# Patient Record
Sex: MALE | Race: WHITE | NOT HISPANIC OR LATINO
[De-identification: names, ages, dates, MRNs, and addresses within clinical notes are randomized per-mention and may not be internally consistent; named-entity substitution may affect disease eponyms.]

---

## 2023-06-27 ENCOUNTER — APPOINTMENT (OUTPATIENT)
Dept: SURGERY | Facility: CLINIC | Age: 63
End: 2023-06-27
Payer: MEDICARE

## 2023-06-27 VITALS — HEIGHT: 67 IN | BODY MASS INDEX: 36.1 KG/M2 | WEIGHT: 230 LBS

## 2023-06-27 DIAGNOSIS — Z86.39 PERSONAL HISTORY OF OTHER ENDOCRINE, NUTRITIONAL AND METABOLIC DISEASE: ICD-10-CM

## 2023-06-27 PROBLEM — Z00.00 ENCOUNTER FOR PREVENTIVE HEALTH EXAMINATION: Status: ACTIVE | Noted: 2023-06-27

## 2023-06-27 PROCEDURE — 99203 OFFICE O/P NEW LOW 30 MIN: CPT

## 2023-06-27 NOTE — ASSESSMENT
[FreeTextEntry1] : Anthony is a pleasant 62 year old retired [employer] worker, who presents to the office with complaints of recent pain and swelling in the left groin, which he has been experiencing for "a couple of months or longer" and which worsened after a physical exam by his urologist approximately 3 weeks ago. He states he does not lift any heavy objects or performs any out of the ordinary strenuous activity on a regular basis.\par \par Physical examination of the right groin does not demonstrate any obvious hernia. Physical exam of his left groin is severely limited by patient's ability to complete the exam.  Patient himself points to the origin of pain to be in the left hemiscrotum.  The umbilical examination is unremarkable.  He does have a moderate diastasis recti, likely related to his excess abdominal weight.   His current BMI is 36.\par \par In light of his equivocal physical exam, I recommended obtaining an ultrasound of his left groin and scrotum in order to aid in the diagnosis and the patient agreed.  I will call him with further recommendations once the imaging is completed and reviewed with Dr. Doyle.  He was encouraged to avoid heavy lifting and strenuous activity in the interim, of course. \par

## 2023-06-27 NOTE — CONSULT LETTER
[Dear  ___] : Dear  [unfilled], [Courtesy Letter:] : I had the pleasure of seeing your patient, [unfilled], in my office today. [Please see my note below.] : Please see my note below. [Consult Closing:] : Thank you very much for allowing me to participate in the care of this patient.  If you have any questions, please do not hesitate to contact me. [FreeTextEntry3] : Sincerely,\par \par Miri Fowler PA-C, VAERY\par  [DrBud  ___] : Dr. ALLEN

## 2023-06-27 NOTE — PHYSICAL EXAM
[Respiratory Effort] : normal respiratory effort [No Rash or Lesion] : No rash or lesion [Alert] : alert [Calm] : calm [JVD] : no jugular venous distention  [de-identified] : overweight [de-identified] : normal [de-identified] : moderately protuberant abdomen [de-identified] : severe tenderness to palpation limiting exam

## 2023-07-07 ENCOUNTER — RESULT REVIEW (OUTPATIENT)
Age: 63
End: 2023-07-07

## 2023-07-07 ENCOUNTER — OUTPATIENT (OUTPATIENT)
Dept: OUTPATIENT SERVICES | Facility: HOSPITAL | Age: 63
LOS: 1 days | End: 2023-07-07
Payer: MEDICARE

## 2023-07-07 DIAGNOSIS — R31.9 HEMATURIA, UNSPECIFIED: ICD-10-CM

## 2023-07-07 DIAGNOSIS — Z00.8 ENCOUNTER FOR OTHER GENERAL EXAMINATION: ICD-10-CM

## 2023-07-07 PROCEDURE — 76870 US EXAM SCROTUM: CPT | Mod: 26

## 2023-07-07 PROCEDURE — 76870 US EXAM SCROTUM: CPT

## 2023-07-08 DIAGNOSIS — R31.9 HEMATURIA, UNSPECIFIED: ICD-10-CM

## 2023-07-10 ENCOUNTER — NON-APPOINTMENT (OUTPATIENT)
Age: 63
End: 2023-07-10

## 2023-08-13 ENCOUNTER — RESULT REVIEW (OUTPATIENT)
Age: 63
End: 2023-08-13

## 2023-08-13 ENCOUNTER — OUTPATIENT (OUTPATIENT)
Dept: OUTPATIENT SERVICES | Facility: HOSPITAL | Age: 63
LOS: 1 days | End: 2023-08-13
Payer: MEDICARE

## 2023-08-13 DIAGNOSIS — R93.812 ABNORMAL RADIOLOGIC FINDINGS ON DIAGNOSTIC IMAGING OF LEFT TESTICLE: ICD-10-CM

## 2023-08-13 PROCEDURE — 72196 MRI PELVIS W/DYE: CPT

## 2023-08-13 PROCEDURE — 72196 MRI PELVIS W/DYE: CPT | Mod: 26,MH

## 2023-08-13 PROCEDURE — A9579: CPT

## 2023-08-14 DIAGNOSIS — R93.812 ABNORMAL RADIOLOGIC FINDINGS ON DIAGNOSTIC IMAGING OF LEFT TESTICLE: ICD-10-CM

## 2023-08-15 ENCOUNTER — EMERGENCY (EMERGENCY)
Facility: HOSPITAL | Age: 63
LOS: 0 days | Discharge: HOME | End: 2023-08-15
Attending: EMERGENCY MEDICINE
Payer: MEDICARE

## 2023-08-15 ENCOUNTER — NON-APPOINTMENT (OUTPATIENT)
Age: 63
End: 2023-08-15

## 2023-08-15 VITALS
WEIGHT: 229.94 LBS | DIASTOLIC BLOOD PRESSURE: 84 MMHG | OXYGEN SATURATION: 98 % | HEIGHT: 66 IN | TEMPERATURE: 98 F | RESPIRATION RATE: 14 BRPM | HEART RATE: 56 BPM | SYSTOLIC BLOOD PRESSURE: 172 MMHG

## 2023-08-15 VITALS
HEART RATE: 60 BPM | SYSTOLIC BLOOD PRESSURE: 166 MMHG | OXYGEN SATURATION: 98 % | DIASTOLIC BLOOD PRESSURE: 89 MMHG | RESPIRATION RATE: 16 BRPM

## 2023-08-15 DIAGNOSIS — N50.82 SCROTAL PAIN: ICD-10-CM

## 2023-08-15 DIAGNOSIS — N50.812 LEFT TESTICULAR PAIN: ICD-10-CM

## 2023-08-15 LAB
ALBUMIN SERPL ELPH-MCNC: 5.1 G/DL — SIGNIFICANT CHANGE UP (ref 3.5–5.2)
ALP SERPL-CCNC: 100 U/L — SIGNIFICANT CHANGE UP (ref 30–115)
ALT FLD-CCNC: 30 U/L — SIGNIFICANT CHANGE UP (ref 0–41)
ANION GAP SERPL CALC-SCNC: 12 MMOL/L — SIGNIFICANT CHANGE UP (ref 7–14)
APPEARANCE UR: CLEAR — SIGNIFICANT CHANGE UP
AST SERPL-CCNC: 28 U/L — SIGNIFICANT CHANGE UP (ref 0–41)
BILIRUB SERPL-MCNC: 1 MG/DL — SIGNIFICANT CHANGE UP (ref 0.2–1.2)
BILIRUB UR-MCNC: NEGATIVE — SIGNIFICANT CHANGE UP
BUN SERPL-MCNC: 16 MG/DL — SIGNIFICANT CHANGE UP (ref 10–20)
CALCIUM SERPL-MCNC: 10.3 MG/DL — SIGNIFICANT CHANGE UP (ref 8.4–10.4)
CHLORIDE SERPL-SCNC: 98 MMOL/L — SIGNIFICANT CHANGE UP (ref 98–110)
CO2 SERPL-SCNC: 25 MMOL/L — SIGNIFICANT CHANGE UP (ref 17–32)
COLOR SPEC: YELLOW — SIGNIFICANT CHANGE UP
CREAT SERPL-MCNC: 1.1 MG/DL — SIGNIFICANT CHANGE UP (ref 0.7–1.5)
DIFF PNL FLD: NEGATIVE — SIGNIFICANT CHANGE UP
EGFR: 76 ML/MIN/1.73M2 — SIGNIFICANT CHANGE UP
GLUCOSE SERPL-MCNC: 111 MG/DL — HIGH (ref 70–99)
GLUCOSE UR QL: NEGATIVE MG/DL — SIGNIFICANT CHANGE UP
HCT VFR BLD CALC: 45.6 % — SIGNIFICANT CHANGE UP (ref 42–52)
HGB BLD-MCNC: 15.6 G/DL — SIGNIFICANT CHANGE UP (ref 14–18)
KETONES UR-MCNC: NEGATIVE MG/DL — SIGNIFICANT CHANGE UP
LEUKOCYTE ESTERASE UR-ACNC: NEGATIVE — SIGNIFICANT CHANGE UP
MCHC RBC-ENTMCNC: 30.1 PG — SIGNIFICANT CHANGE UP (ref 27–31)
MCHC RBC-ENTMCNC: 34.2 G/DL — SIGNIFICANT CHANGE UP (ref 32–37)
MCV RBC AUTO: 87.9 FL — SIGNIFICANT CHANGE UP (ref 80–94)
NITRITE UR-MCNC: NEGATIVE — SIGNIFICANT CHANGE UP
NRBC # BLD: 0 /100 WBCS — SIGNIFICANT CHANGE UP (ref 0–0)
PH UR: 7 — SIGNIFICANT CHANGE UP (ref 5–8)
PLATELET # BLD AUTO: 277 K/UL — SIGNIFICANT CHANGE UP (ref 130–400)
PMV BLD: 9.9 FL — SIGNIFICANT CHANGE UP (ref 7.4–10.4)
POTASSIUM SERPL-MCNC: 5.5 MMOL/L — HIGH (ref 3.5–5)
POTASSIUM SERPL-SCNC: 5.5 MMOL/L — HIGH (ref 3.5–5)
PROT SERPL-MCNC: 8.6 G/DL — HIGH (ref 6–8)
PROT UR-MCNC: NEGATIVE MG/DL — SIGNIFICANT CHANGE UP
RBC # BLD: 5.19 M/UL — SIGNIFICANT CHANGE UP (ref 4.7–6.1)
RBC # FLD: 13.3 % — SIGNIFICANT CHANGE UP (ref 11.5–14.5)
SODIUM SERPL-SCNC: 135 MMOL/L — SIGNIFICANT CHANGE UP (ref 135–146)
SP GR SPEC: 1.01 — SIGNIFICANT CHANGE UP (ref 1–1.03)
UROBILINOGEN FLD QL: 1 MG/DL — SIGNIFICANT CHANGE UP (ref 0.2–1)
WBC # BLD: 8.65 K/UL — SIGNIFICANT CHANGE UP (ref 4.8–10.8)
WBC # FLD AUTO: 8.65 K/UL — SIGNIFICANT CHANGE UP (ref 4.8–10.8)

## 2023-08-15 PROCEDURE — 99284 EMERGENCY DEPT VISIT MOD MDM: CPT

## 2023-08-15 PROCEDURE — 99284 EMERGENCY DEPT VISIT MOD MDM: CPT | Mod: 25

## 2023-08-15 PROCEDURE — 87086 URINE CULTURE/COLONY COUNT: CPT

## 2023-08-15 PROCEDURE — 85027 COMPLETE CBC AUTOMATED: CPT

## 2023-08-15 PROCEDURE — 80053 COMPREHEN METABOLIC PANEL: CPT

## 2023-08-15 PROCEDURE — 76870 US EXAM SCROTUM: CPT

## 2023-08-15 PROCEDURE — 36415 COLL VENOUS BLD VENIPUNCTURE: CPT

## 2023-08-15 PROCEDURE — 99283 EMERGENCY DEPT VISIT LOW MDM: CPT

## 2023-08-15 PROCEDURE — 81003 URINALYSIS AUTO W/O SCOPE: CPT

## 2023-08-15 PROCEDURE — 76870 US EXAM SCROTUM: CPT | Mod: 26

## 2023-08-15 NOTE — CONSULT NOTE ADULT - SUBJECTIVE AND OBJECTIVE BOX
UROLOGY CONSULT NOTE:    Pt is a 61yo Male with no pmh presents to the ER, sent by Dr. Doyle for MRI result findings of poor blood flow in left testicle -  called for further evaluation. Pt reports he follows with Dr. Duffy as outpatient and last seen him a few months ago. Pt reports after bedside examination for inguinal hernia he felt sharp pain to the left scrotum and was told he may have a inguinal hernia. Pt decided to follow-up with hernia care center with Dr. Doyle. Pt was recommended to obtain MRI for further imaging. Pt denies any recent trauma, fever, chills, N/V, abdominal pain, flank pain, dysuria, hematuria, SOB, or chest pain.       PAST MEDICAL & SURGICAL HISTORY:  denies pmh   denies psh        Allergies  No Known Allergies        SOCIAL HISTORY: No illicit drug use    REVIEW OF SYSTEMS   [x] A ten-point review of systems was otherwise negative except as noted.      Vital Signs Last 24 Hrs  T(C): 36.7 (15 Aug 2023 13:23), Max: 36.7 (15 Aug 2023 13:23)  T(F): 98 (15 Aug 2023 13:23), Max: 98 (15 Aug 2023 13:23)  HR: 56 (15 Aug 2023 13:23) (56 - 56)  BP: 172/84 (15 Aug 2023 13:23) (172/84 - 172/84)  RR: 14 (15 Aug 2023 13:23) (14 - 14)  SpO2: 98% (15 Aug 2023 13:23) (98% - 98%)    Parameters below as of 15 Aug 2023 13:23  Patient On (Oxygen Delivery Method): room air        PHYSICAL EXAM:  GEN: NAD, awake and alert.  SKIN: Good color, non diaphoretic.  RESP: Non-labored breathing. No use of accessory muscles.  ABDO: Soft, NT/ND, no palpable bladder, no suprapubic tenderness.  BACK: No CVAT B/L  : + Circumcised male. right descended testicle, unable to palpate left testicle, high riding. no TTP, erythema or edema noted. No meatal discharge.   MSK: SCHNEIDER x 4  EXT: no b/l LE edema   NEURO: A&O X3    LABS:                        15.6   8.65  )-----------( 277      ( 15 Aug 2023 16:06 )             45.6     08-15    135  |  98  |  16  ----------------------------<  111<H>  5.5<H>   |  25  |  1.1    Ca    10.3      15 Aug 2023 16:06    TPro  8.6<H>  /  Alb  5.1  /  TBili  1.0  /  DBili  x   /  AST  28  /  ALT  30  /  AlkPhos  100  08-15          RADIOLOGY & ADDITIONAL STUDIES:  pending scrotal US

## 2023-08-15 NOTE — CONSULT NOTE ADULT - NS ATTEND AMEND GEN_ALL_CORE FT
I personally saw and examined the patient, reviewed the chart and available data. I discussed the situation with the patient and his wife as well as ER attending and JAJA WING. I also reviewed and/or amended the note as necessary.

## 2023-08-15 NOTE — ED PROVIDER NOTE - PHYSICAL EXAMINATION
VITAL SIGNS: I have reviewed nursing notes and confirm.  CONSTITUTIONAL: Well-appearing, non-toxic, in NAD  SKIN: Warm dry, normal skin turgor  HEAD: NCAT  EYES: No conjunctival injection, scleral anicteric  ENT: Moist mucous membranes, normal pharynx with no erythema or exudates  NECK: Supple; full ROM. Nontender. No cervical LAD  CARD: RRR, no murmurs, rubs or gallops  RESP: Clear to ausculation bilaterally.  No rales, rhonchi, or wheezing.  ABD: Soft, non-distended, non-tender, no rebound or guarding. No CVA tenderness  : left testicle not-fully distended and tender to palpation - chronic per patient  EXT: Full ROM, no bony tenderness, no pedal edema, no calf tenderness  NEURO: Normal motor, normal sensory. CN II-XII grossly intact. Cerebellar testing normal. Normal gait.  PSYCH: Cooperative, appropriate.

## 2023-08-15 NOTE — ED PROVIDER NOTE - PATIENT PORTAL LINK FT
You can access the FollowMyHealth Patient Portal offered by [facility] by registering at the following website: [email or web address]. By joining [facility]’s FollowMyHealth portal, you will also be able to view your health information using other applications (apps) compatible with our system. You can access the FollowMyHealth Patient Portal offered by [facility] by registering at the following website: [email or web address]. By joining [facility]’s FollowMyHealth portal, you will also be able to view your health information using other applications (apps) compatible with our system.

## 2023-08-15 NOTE — ED ADULT TRIAGE NOTE - CHIEF COMPLAINT QUOTE
per pt had recent MRI done outpt and showed "poor blood flow in left testicle" sent in by dr contreras

## 2023-08-15 NOTE — CONSULT NOTE ADULT - ASSESSMENT
61yo Male with no pmh presents to the ER, sent by Dr. Doyle for MRI result findings of poor blood flow in left testicle -  called for further evaluation.    Plan:  ·	recommend scrotal sonogram   ·	WBC wnl  ·	Cr stable 1.1    ·	keep NPO for now   ·	spoke with ED, aware of plan   ·	will discuss with attending  61yo Male with no pmh presents to the ER, sent by Dr. Doyle for MRI result findings of poor blood flow in left testicle -  called for further evaluation.    Plan:  ·	recommend scrotal sonogram   ·	WBC wnl  ·	Cr stable 1.1    ·	keep NPO for now   ·	spoke with ED, aware of plan   ·	will discuss with attending   ·	reviewed films, note and spoke to the pt and his wife  ·	his pain has been on and off for years and he never mentioned it to dr elizabeth as he thought it was part of aging  ·	after sono was non dxtic he went to [place] and on return had hte mri and now the sono here  ·	flow is quite poor to the left testicle, ??? 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  ·	Rec f/u with his urologist of close to 15 years to discuss options  ·	if pain not bother him enough ? let it continue to involute   ·	if it bothers him or turns necrotic go for a simple orchiectomy without or with a prosthesis    pt and wife sonya think about it and if they wish f/u with me they will call the office

## 2023-08-15 NOTE — ED PROVIDER NOTE - NSFOLLOWUPINSTRUCTIONS_ED_ALL_ED_FT
Our Emergency Department Referral Coordinators will be reaching out to you in the next 24-48 hours from 9:00am to 5:00pm with a follow up appointment. Please expect a phone call from the hospital in that time frame. If you do not receive a call or if you have any questions or concerns, you can reach them at   [phone]     Testicle Pain    WHAT YOU NEED TO KNOW:    Testicle pain may start in your scrotum and spread to your abdomen. You may have sharp, sudden pain or dull pain that happens over time. Your testicle pain may come and go, or it may last for a long time. The cause of your pain may be unknown. Testicle pain can be caused by infection, trauma, hernia, kidney stones, or sexually transmitted infections (STIs). You may have a painful lump in your scrotum. The lump may be caused by an enlarged vein or fluid that collects around one of your testicles. This lump also may be caused by a more serious medical condition. Part of your testicle may twist. This is a serious condition that needs treatment as soon as possible.    DISCHARGE INSTRUCTIONS:    Medicines:    Antibiotics: This medicine helps fight or prevent infection. Take your antibiotics until they are gone, even if you feel better.    Pain medicine: You may be given a prescription medicine to decrease pain. Do not wait until the pain is severe before you take this medicine.    NSAIDs: These medicines decrease swelling, pain, and fever. NSAIDs are available without a doctor's order. Ask your healthcare provider which medicine is right for you. Ask how much to take and when to take it. Take as directed. NSAIDs can cause stomach bleeding and kidney problems if not taken correctly.    Take your medicine as directed. Contact your healthcare provider if you think your medicine is not helping or if you have side effects. Tell your provider if you are allergic to any medicine. Keep a list of the medicines, vitamins, and herbs you take. Include the amounts, and when and why you take them. Bring the list or the pill bottles to follow-up visits. Carry your medicine list with you in case of an emergency.  Decrease discomfort: With treatment, your pain may improve within 1 to 3 days. Depending on the cause of your testicle pain, your condition may take up to 4 weeks to heal.    Rest: Limit your activity until your pain decreases. Get more rest while you heal. Do not sit for long periods of time.    Cold packs: Place cold packs on your testicles to help ease your pain. Use cold packs as directed.    Elevation: Gently tuck a folded towel under your testicles to lift them as you sit in a chair or lie in bed. This will help ease your pain and decrease swelling.  Follow up with your healthcare provider or urologist in 3 to 7 days: Write down your questions so you remember to ask them during your visits.    Sexual activity: Avoid sexual activity until you have finished your antibiotics or until your healthcare provider tells you it is safe to have sex. Use condoms to lower your risk of STIs.    Contact your healthcare provider or urologist if:    You feel that your medicine or treatment is not working.    You feel more pain, tenderness, or swelling than before.    You have nausea or a low fever.    You have questions or concerns about your condition or care.  Return to the emergency department if:    You have sudden or severe pain in your testicles or abdomen.    You have pain in both testicles.    You are vomiting.    You have a high fever.    Your pain increases when you elevate your testicles.    Your scrotum turns blue. This could mean your testicle is not getting the blood flow it needs.

## 2023-08-15 NOTE — ED PROVIDER NOTE - ATTENDING CONTRIBUTION TO CARE
62-year-old man with with no significant PMHx, in the ER for evaluation of testicular pain, abnormal testicular MRI.  Patient states his L testicle has always been very high riding as compared to his R testicle.  He has been having pain in L testicle for the past 2 years, ~ 4 months ago had  appointment with Dr. Duffy, states experienced very sharp pain to his L testicle when he was examined, which has been continuous since that time.  Patient was told to follow-up with surgery, Dr. Doyle for hernia evaluation. As part of hernia eval,  pt had scrotal ultrasound 7/7/2023 which showed septated cystic structure adjacent to L testicle, MRI recommended.  MRI done 2 days ago showed chronic ischemia/infarction of the L testicle; small fat-containing L inguinal hernia.  Patient was called in, told to come to ER for evaluation.  Patient denies any dysuria/hematuria/frequency.  No abdominal pain.  No N/V/D.  No F/C.  No CP/SOB.  Denies any trauma to area.  No HA/dizziness/syncope.  PE - nad, nc/at, eomi, perrl, op - clear, mmm, neck supple, cta b/l, no w/r/r, rrr, abd- soft, nt/nd, nabs, (with resident Mitch):  circ male, no penile tenderness/swelling,R testicle - no tenderness/swelling,  high riding L testicle, + tenderness to L testicle,  from x 4, no LE swelling/tenderness, A&O x 3, cn 2-12 intact, no focal motor/sensory deficits.   -Check labs, UA, testicular sono,  eval

## 2023-08-15 NOTE — ED ADULT NURSE NOTE - OBJECTIVE STATEMENT
Dr. Doyle for MRI result findings of poor blood flow in left testicle -  called for further evaluation. Pt reports he follows with Dr. Duffy as outpatient and last seen him a few months ago. Pt reports after bedside examination for inguinal hernia he felt sharp pain to the left scrotum and was told he may have a inguinal hernia

## 2023-08-15 NOTE — ED PROVIDER NOTE - OBJECTIVE STATEMENT
Patient is a 62 year old male with no PMH presenting for abnormal MRI results. Patient states he was sent  to the ER, sent by Dr. Doyle for MRI result findings of poor blood flow in left testicle -  called for further evaluation. Pt reports he follows with Dr. Duffy as outpatient and last seen him a few months ago. Pt reports after bedside examination for inguinal hernia he felt sharp pain to the left scrotum and was told he may have a inguinal hernia. Pt decided to follow-up with hernia care center with Dr. Doyle. Pt was recommended to obtain MRI for further imaging. Pt denies any recent trauma, fever, chills, N/V, abdominal pain, flank pain, dysuria, hematuria, SOB, or chest pain.

## 2023-08-15 NOTE — ED ADULT NURSE NOTE - NSICDXFAMILYHX_GEN_ALL_CORE_FT
Abdomen soft, non-tender, no guarding.
FAMILY HISTORY:  No pertinent family history in first degree relatives

## 2023-08-15 NOTE — ED PROVIDER NOTE - CARE PROVIDER_API CALL
Hien Moreno.  Urology  [address] [place] [address], [address] [address]  Phone: [phone]  Fax: [phone]  Follow Up Time: Routine

## 2023-08-15 NOTE — ED ADULT TRIAGE NOTE - TEMPERATURE IN FAHRENHEIT (DEGREES F)
Johana states pre-certification need to be submitted for patient MRI to be approved for insurance. Please give the number a call.Thanks      *Johana did not want to leave her contact information   
Returned call to [employer],   States the MRI will not be covered due to the following:  No exam,  No xray,  No appt,   6 week trial of conservative therapy.  Case#[id number]    Patient informed of this,  appt scheduled for next week Tue in [place],  She verbalized understanding,  No further concerns or questions.    
98

## 2023-08-15 NOTE — ED PROVIDER NOTE - CLINICAL SUMMARY MEDICAL DECISION MAKING FREE TEXT BOX
62-year-old man in ER with c/o L testicular pain for ~2 years, worse for the pas 4 months.  MRI done 2 days ago showing chronic ischemia/infarction of the L testicle.  Labs reviewed: CBC/CMP unremarkable.  UA negative.  Testicular sono: Normal-appearing R testicle.  Findings compatible with infarct of the L testicle with few areas of peripheral flow.  Patient seen and evaluated in ER by  attending  > recommended for patient to follow-up with his own urologist, Dr. Duffy, to discuss treatment options.  Patient to return to ER for worsening pain, fever, scrotal swelling/discoloration, vomiting, or any other new/concerning symptoms.  Patient understands and agrees with plan.

## 2023-08-16 LAB
CULTURE RESULTS: SIGNIFICANT CHANGE UP
SPECIMEN SOURCE: SIGNIFICANT CHANGE UP

## 2023-08-21 ENCOUNTER — APPOINTMENT (OUTPATIENT)
Dept: UROLOGY | Facility: CLINIC | Age: 63
End: 2023-08-21
Payer: MEDICARE

## 2023-08-21 VITALS
BODY MASS INDEX: 36.41 KG/M2 | WEIGHT: 232 LBS | HEIGHT: 67 IN | DIASTOLIC BLOOD PRESSURE: 65 MMHG | TEMPERATURE: 97.8 F | SYSTOLIC BLOOD PRESSURE: 108 MMHG

## 2023-08-21 PROCEDURE — 64450 NJX AA&/STRD OTHER PN/BRANCH: CPT | Mod: LT

## 2023-08-21 PROCEDURE — 99214 OFFICE O/P EST MOD 30 MIN: CPT | Mod: 25

## 2023-08-21 PROCEDURE — 64425 NJX AA&/STRD II IH NERVES: CPT | Mod: LT

## 2023-08-21 NOTE — HISTORY OF PRESENT ILLNESS
[Currently Experiencing ___] :  [unfilled] [FreeTextEntry1] : Madison is a 62-year-old male born August 23, 1960 presents for evaluation of left testicular pain with imaging that demonstrates chronic ischemia/infarction of the left testicle.  He reports having severe intermittent left testicular pain for many years.  He denies ever having surgery or trauma to the area, aside from a vasectomy over 15 years ago.  He had a hernia repair at 7 years of age, unsure of what kind.  He saw Dr. Doyle for his left groin pain and was sent for an MRI, which demonstrated poor blood flow in the left testicle.  He was sent to emergency room for further evaluation.  Ultrasound demonstrated normal-appearing right testicle with documented flow.  Left testicle findings are compatible with infarct with few areas of peripheral blood flow.  He denies any possible urinary tract symptoms or issues with erectile dysfunction/libido  I saw him when he was in the emergency room discussed the situation recommend that he follow-up with Dr. Duffy his current urologist.  After he and his wife discussed this at home they elected to see me for consultation of possible care

## 2023-08-21 NOTE — PHYSICAL EXAM
[General Appearance - Well Developed] : well developed [General Appearance - Well Nourished] : well nourished [Normal Appearance] : normal appearance [Well Groomed] : well groomed [General Appearance - In No Acute Distress] : no acute distress [Abdomen Soft] : soft [Abdomen Tenderness] : non-tender [Abdomen Hernia] : no hernia was discovered [Costovertebral Angle Tenderness] : no ~M costovertebral angle tenderness [Urethral Meatus] : meatus normal [Penis Abnormality] : normal circumcised penis [Urinary Bladder Findings] : the bladder was normal on palpation [Scrotum] : the scrotum was normal [Testes Mass (___cm)] : there were no testicular masses [] : no respiratory distress [Edema] : no peripheral edema [Respiration, Rhythm And Depth] : normal respiratory rhythm and effort [Exaggerated Use Of Accessory Muscles For Inspiration] : no accessory muscle use [Oriented To Time, Place, And Person] : oriented to person, place, and time [Affect] : the affect was normal [Mood] : the mood was normal [Not Anxious] : not anxious [Normal Station and Gait] : the gait and station were normal for the patient's age [No Focal Deficits] : no focal deficits [Femoral Lymph Nodes Enlarged Bilaterally] : femoral [Inguinal Lymph Nodes Enlarged Bilaterally] : inguinal [FreeTextEntry1] : Severe tenderness of the left epididymis.  Patient had to be injected with lidocaine as a cord block for examination

## 2023-08-21 NOTE — LETTER HEADER
[FreeTextEntry3] : Hien Moreno M.D. Director Emeritus of Urology [facility]/[address], [address] [place] [address]

## 2023-08-21 NOTE — ASSESSMENT
[FreeTextEntry1] : He has left testicular infarct for reasons unknown.  This may be a chronic as the MRI suggests.    Even with the cord block of 5 mL of 2% lidocaine the area was  and his pain seems to be located in the left caput epididymis.  his pain is significant and we discussed several options available.    #1 he has had this for a while he can live with it.  He is concerned that if we take out the testicle his testosterone will drop and his erections which right now he says are excellent we will decrease.  I cannot guarantee that he will have a drop in his testosterone but at least by ultrasound criteria there is minimal blood flow to the left testicle and I would assume that is not contributing a major portion of testosterone.  If his testosterone is infected we can always go to testosterone though obviously taking testosterone for the rest of his life would not be a choice we would make electively  2.  We could try a denervation it may or may not work as a lot more involved and he is not willing to go through that aspect   3.  We can do an epididymectomy which may or may not help again he does not want to go through a procedure and then still have the pain  4.  We can remove the left testicle.  At this time he is electing for left orchiectomy.  All aspects of procedure reviewed in detail with regard to preparation, outcomes, and adverse events.  He will get medical clearance and follow-up in the OR as scheduled.

## 2023-08-21 NOTE — LETTER BODY
[Dear  ___] : Dear  [unfilled], [Courtesy Letter:] : I had the pleasure of seeing your patient, [unfilled], in my office today. [Please see my note below.] : Please see my note below. [Sincerely,] : Sincerely, [FreeTextEntry2] : Bob Castellanos MD [address] [address]

## 2023-09-22 ENCOUNTER — RESULT REVIEW (OUTPATIENT)
Age: 63
End: 2023-09-22

## 2023-09-22 ENCOUNTER — OUTPATIENT (OUTPATIENT)
Dept: OUTPATIENT SERVICES | Facility: HOSPITAL | Age: 63
LOS: 1 days | End: 2023-09-22
Payer: MEDICARE

## 2023-09-22 VITALS
OXYGEN SATURATION: 100 % | HEART RATE: 63 BPM | WEIGHT: 229.94 LBS | TEMPERATURE: 98 F | DIASTOLIC BLOOD PRESSURE: 84 MMHG | HEIGHT: 68 IN | SYSTOLIC BLOOD PRESSURE: 140 MMHG | RESPIRATION RATE: 18 BRPM

## 2023-09-22 DIAGNOSIS — Z98.52 VASECTOMY STATUS: Chronic | ICD-10-CM

## 2023-09-22 DIAGNOSIS — Z98.890 OTHER SPECIFIED POSTPROCEDURAL STATES: Chronic | ICD-10-CM

## 2023-09-22 DIAGNOSIS — N50.812 LEFT TESTICULAR PAIN: ICD-10-CM

## 2023-09-22 DIAGNOSIS — Z01.818 ENCOUNTER FOR OTHER PREPROCEDURAL EXAMINATION: ICD-10-CM

## 2023-09-22 LAB
ALBUMIN SERPL ELPH-MCNC: 4.6 G/DL — SIGNIFICANT CHANGE UP (ref 3.5–5.2)
ALP SERPL-CCNC: 96 U/L — SIGNIFICANT CHANGE UP (ref 30–115)
ALT FLD-CCNC: 19 U/L — SIGNIFICANT CHANGE UP (ref 0–41)
ANION GAP SERPL CALC-SCNC: 14 MMOL/L — SIGNIFICANT CHANGE UP (ref 7–14)
APPEARANCE UR: CLEAR — SIGNIFICANT CHANGE UP
APTT BLD: 33.2 SEC — SIGNIFICANT CHANGE UP (ref 27–39.2)
AST SERPL-CCNC: 17 U/L — SIGNIFICANT CHANGE UP (ref 0–41)
BASOPHILS # BLD AUTO: 0.06 K/UL — SIGNIFICANT CHANGE UP (ref 0–0.2)
BASOPHILS NFR BLD AUTO: 0.7 % — SIGNIFICANT CHANGE UP (ref 0–1)
BILIRUB SERPL-MCNC: 0.6 MG/DL — SIGNIFICANT CHANGE UP (ref 0.2–1.2)
BILIRUB UR-MCNC: NEGATIVE — SIGNIFICANT CHANGE UP
BUN SERPL-MCNC: 19 MG/DL — SIGNIFICANT CHANGE UP (ref 10–20)
CALCIUM SERPL-MCNC: 9.4 MG/DL — SIGNIFICANT CHANGE UP (ref 8.4–10.5)
CHLORIDE SERPL-SCNC: 98 MMOL/L — SIGNIFICANT CHANGE UP (ref 98–110)
CO2 SERPL-SCNC: 25 MMOL/L — SIGNIFICANT CHANGE UP (ref 17–32)
COLOR SPEC: YELLOW — SIGNIFICANT CHANGE UP
CREAT SERPL-MCNC: 1.2 MG/DL — SIGNIFICANT CHANGE UP (ref 0.7–1.5)
DIFF PNL FLD: NEGATIVE — SIGNIFICANT CHANGE UP
EGFR: 68 ML/MIN/1.73M2 — SIGNIFICANT CHANGE UP
EOSINOPHIL # BLD AUTO: 0.35 K/UL — SIGNIFICANT CHANGE UP (ref 0–0.7)
EOSINOPHIL NFR BLD AUTO: 4.2 % — SIGNIFICANT CHANGE UP (ref 0–8)
GLUCOSE SERPL-MCNC: 73 MG/DL — SIGNIFICANT CHANGE UP (ref 70–99)
GLUCOSE UR QL: NEGATIVE MG/DL — SIGNIFICANT CHANGE UP
HCT VFR BLD CALC: 43.5 % — SIGNIFICANT CHANGE UP (ref 42–52)
HGB BLD-MCNC: 14.2 G/DL — SIGNIFICANT CHANGE UP (ref 14–18)
IMM GRANULOCYTES NFR BLD AUTO: 0.6 % — HIGH (ref 0.1–0.3)
INR BLD: 0.89 RATIO — SIGNIFICANT CHANGE UP (ref 0.65–1.3)
KETONES UR-MCNC: ABNORMAL MG/DL
LEUKOCYTE ESTERASE UR-ACNC: NEGATIVE — SIGNIFICANT CHANGE UP
LYMPHOCYTES # BLD AUTO: 2.72 K/UL — SIGNIFICANT CHANGE UP (ref 1.2–3.4)
LYMPHOCYTES # BLD AUTO: 32.9 % — SIGNIFICANT CHANGE UP (ref 20.5–51.1)
MCHC RBC-ENTMCNC: 29.4 PG — SIGNIFICANT CHANGE UP (ref 27–31)
MCHC RBC-ENTMCNC: 32.6 G/DL — SIGNIFICANT CHANGE UP (ref 32–37)
MCV RBC AUTO: 90.1 FL — SIGNIFICANT CHANGE UP (ref 80–94)
MONOCYTES # BLD AUTO: 0.56 K/UL — SIGNIFICANT CHANGE UP (ref 0.1–0.6)
MONOCYTES NFR BLD AUTO: 6.8 % — SIGNIFICANT CHANGE UP (ref 1.7–9.3)
NEUTROPHILS # BLD AUTO: 4.52 K/UL — SIGNIFICANT CHANGE UP (ref 1.4–6.5)
NEUTROPHILS NFR BLD AUTO: 54.8 % — SIGNIFICANT CHANGE UP (ref 42.2–75.2)
NITRITE UR-MCNC: NEGATIVE — SIGNIFICANT CHANGE UP
NRBC # BLD: 0 /100 WBCS — SIGNIFICANT CHANGE UP (ref 0–0)
PH UR: 7 — SIGNIFICANT CHANGE UP (ref 5–8)
PLATELET # BLD AUTO: 275 K/UL — SIGNIFICANT CHANGE UP (ref 130–400)
PMV BLD: 11.5 FL — HIGH (ref 7.4–10.4)
POTASSIUM SERPL-MCNC: 3.9 MMOL/L — SIGNIFICANT CHANGE UP (ref 3.5–5)
POTASSIUM SERPL-SCNC: 3.9 MMOL/L — SIGNIFICANT CHANGE UP (ref 3.5–5)
PROT SERPL-MCNC: 7.4 G/DL — SIGNIFICANT CHANGE UP (ref 6–8)
PROT UR-MCNC: NEGATIVE MG/DL — SIGNIFICANT CHANGE UP
PROTHROM AB SERPL-ACNC: 10.1 SEC — SIGNIFICANT CHANGE UP (ref 9.95–12.87)
RBC # BLD: 4.83 M/UL — SIGNIFICANT CHANGE UP (ref 4.7–6.1)
RBC # FLD: 13.4 % — SIGNIFICANT CHANGE UP (ref 11.5–14.5)
SODIUM SERPL-SCNC: 137 MMOL/L — SIGNIFICANT CHANGE UP (ref 135–146)
SP GR SPEC: 1.03 — SIGNIFICANT CHANGE UP (ref 1–1.03)
UROBILINOGEN FLD QL: 1 MG/DL — SIGNIFICANT CHANGE UP (ref 0.2–1)
WBC # BLD: 8.26 K/UL — SIGNIFICANT CHANGE UP (ref 4.8–10.8)
WBC # FLD AUTO: 8.26 K/UL — SIGNIFICANT CHANGE UP (ref 4.8–10.8)

## 2023-09-22 PROCEDURE — 81003 URINALYSIS AUTO W/O SCOPE: CPT

## 2023-09-22 PROCEDURE — 93010 ELECTROCARDIOGRAM REPORT: CPT

## 2023-09-22 PROCEDURE — 99214 OFFICE O/P EST MOD 30 MIN: CPT | Mod: 25

## 2023-09-22 PROCEDURE — 87086 URINE CULTURE/COLONY COUNT: CPT

## 2023-09-22 PROCEDURE — 80053 COMPREHEN METABOLIC PANEL: CPT

## 2023-09-22 PROCEDURE — 71046 X-RAY EXAM CHEST 2 VIEWS: CPT | Mod: 26

## 2023-09-22 PROCEDURE — 85730 THROMBOPLASTIN TIME PARTIAL: CPT

## 2023-09-22 PROCEDURE — 93005 ELECTROCARDIOGRAM TRACING: CPT

## 2023-09-22 PROCEDURE — 71046 X-RAY EXAM CHEST 2 VIEWS: CPT

## 2023-09-22 PROCEDURE — 85610 PROTHROMBIN TIME: CPT

## 2023-09-22 PROCEDURE — 36415 COLL VENOUS BLD VENIPUNCTURE: CPT

## 2023-09-22 PROCEDURE — 85025 COMPLETE CBC W/AUTO DIFF WBC: CPT

## 2023-09-22 NOTE — H&P PST ADULT - GENITOURINARY MALE
normal external genitalia/no hernia/no discharge/no mass/no tenderness/no ulcer/normal/no penile lesion/no palpable testicular mass/no scrotal mass exquisitely tender left testicle/no hernia/no discharge/no mass/no ulcer/normal/no penile lesion/no palpable testicular mass/no scrotal mass

## 2023-09-22 NOTE — H&P PST ADULT - NSSUBSTANCEUSE_GEN_ALL_CORE_SD
never used Carac Counseling:  I discussed with the patient the risks of Carac including but not limited to erythema, scaling, itching, weeping, crusting, and pain.

## 2023-09-22 NOTE — H&P PST ADULT - ASSESSMENT
npo  nkda  no nsaids  note alternatives were discussed at length including placement of a prosthesis  all other options declined

## 2023-09-22 NOTE — H&P PST ADULT - HISTORY OF PRESENT ILLNESS
Patient is a 63 year old male presenting to PAST in preparation for Transscrotal left orchiectomy on 10/10 under lsb anesthesia by Dr. Moreno  Reports he has a digital rectal exam 7/23 he began experiencing discomfort and h\saw a urologist who later performed an ultrasound. reports there is no circulation to the left testicle has been advised to have above  PATIENT CURRENTLY DENIES CHEST PAIN  SHORTNESS OF BREATH  PALPITATIONS,  DYSURIA, OR UPPER RESPIRATORY INFECTION IN PAST   2 weeks     Anesthesia Alert  NO--Difficult Airway  NO--History of neck surgery or radiation  NO--Limited ROM of neck  NO--History of Malignant hyperthermia  NO--Personal or family history of Pseudocholinesterase deficiency  NO--Prior Anesthesia Complication  NO--Latex Allergy  NO--Loose teeth  NO--History of Rheumatoid Arthritis  NO--ANNMARIE  NO-- BLEEDING RISK  NO--Other_____    Duke Activity Status Index (DASI) from [employer]  on 9/22/2023      RESULT SUMMARY:  38.2 points  The higher the score (maximum 58.2), the higher the functional status.    7.44 METs        INPUTS:  Take care of self —> 2.75 = Yes  Walk indoors —> 1.75 = Yes  Walk 1&ndash;2 blocks on level ground —> 2.75 = Yes  Climb a flight of stairs or walk up a hill —> 5.5 = Yes  Run a short distance —> 8 = Yes  Do light work around the house —> 2.7 = Yes  Do moderate work around the house —> 3.5 = Yes  Do heavy work around the house —> 0 = No  Do yardwork —> 0 = No  Have sexual relations —> 5.25 = Yes  Participate in moderate recreational activities —> 6 = Yes  Participate in strenuous sports —> 0 = No      Revised Cardiac Risk Index for Pre-Operative Risk from [employer]  on 9/22/2023      RESULT SUMMARY:  0 points  Class I Risk    3.9 %  30-day risk of death, MI, or cardiac arrest    From [employer]ppe 2017, based on pooled data from 5 high quality external validations (4 prospective). These numbers are higher than those often quoted from the now-outdated original study (Elías 1999). See Evidence for details.      INPUTS:  Elevated-risk surgery —> 0 = No  History of ischemic heart disease —> 0 = No  History of congestive heart failure —> 0 = No  History of cerebrovascular disease —> 0 = No  Pre-operative treatment with insulin —> 0 = No  Pre-operative creatinine >2 mg/dL / 176.8 µmol/L —> 0 = No          As per patient, this is their complete medical and surgical history, including medications both prescribed or over the counter.  Patient verbalized understanding of instructions and was given the opportunity to ask questions and have them answered.

## 2023-09-23 DIAGNOSIS — Z01.818 ENCOUNTER FOR OTHER PREPROCEDURAL EXAMINATION: ICD-10-CM

## 2023-09-23 DIAGNOSIS — N50.812 LEFT TESTICULAR PAIN: ICD-10-CM

## 2023-09-23 LAB
CULTURE RESULTS: SIGNIFICANT CHANGE UP
SPECIMEN SOURCE: SIGNIFICANT CHANGE UP

## 2023-10-09 NOTE — ASU PATIENT PROFILE, ADULT - SITE
scrotum
Detail Level: Simple
Instructions: This plan will send the code FBSE to the PM system.  DO NOT or CHANGE the price.
Price (Do Not Change): 0.00

## 2023-10-10 ENCOUNTER — APPOINTMENT (OUTPATIENT)
Dept: UROLOGY | Facility: HOSPITAL | Age: 63
End: 2023-10-10

## 2023-10-10 ENCOUNTER — TRANSCRIPTION ENCOUNTER (OUTPATIENT)
Age: 63
End: 2023-10-10

## 2023-10-10 ENCOUNTER — OUTPATIENT (OUTPATIENT)
Dept: OUTPATIENT SERVICES | Facility: HOSPITAL | Age: 63
LOS: 1 days | Discharge: HOME | End: 2023-10-10
Payer: MEDICARE

## 2023-10-10 ENCOUNTER — RESULT REVIEW (OUTPATIENT)
Age: 63
End: 2023-10-10

## 2023-10-10 VITALS
HEART RATE: 61 BPM | HEIGHT: 68 IN | TEMPERATURE: 98 F | WEIGHT: 229.94 LBS | DIASTOLIC BLOOD PRESSURE: 77 MMHG | RESPIRATION RATE: 16 BRPM | SYSTOLIC BLOOD PRESSURE: 132 MMHG | OXYGEN SATURATION: 100 %

## 2023-10-10 VITALS
RESPIRATION RATE: 16 BRPM | DIASTOLIC BLOOD PRESSURE: 73 MMHG | OXYGEN SATURATION: 98 % | SYSTOLIC BLOOD PRESSURE: 114 MMHG | HEART RATE: 63 BPM

## 2023-10-10 DIAGNOSIS — Z98.52 VASECTOMY STATUS: Chronic | ICD-10-CM

## 2023-10-10 DIAGNOSIS — N50.812 LEFT TESTICULAR PAIN: ICD-10-CM

## 2023-10-10 DIAGNOSIS — Z98.890 OTHER SPECIFIED POSTPROCEDURAL STATES: Chronic | ICD-10-CM

## 2023-10-10 PROCEDURE — 88313 SPECIAL STAINS GROUP 2: CPT

## 2023-10-10 PROCEDURE — 88342 IMHCHEM/IMCYTCHM 1ST ANTB: CPT

## 2023-10-10 PROCEDURE — 88305 TISSUE EXAM BY PATHOLOGIST: CPT

## 2023-10-10 PROCEDURE — 88341 IMHCHEM/IMCYTCHM EA ADD ANTB: CPT

## 2023-10-10 PROCEDURE — 54520 REMOVAL OF TESTIS: CPT | Mod: LT

## 2023-10-10 PROCEDURE — 88312 SPECIAL STAINS GROUP 1: CPT | Mod: 26

## 2023-10-10 PROCEDURE — 88312 SPECIAL STAINS GROUP 1: CPT

## 2023-10-10 PROCEDURE — 88341 IMHCHEM/IMCYTCHM EA ADD ANTB: CPT | Mod: 26

## 2023-10-10 PROCEDURE — 88313 SPECIAL STAINS GROUP 2: CPT | Mod: 26

## 2023-10-10 PROCEDURE — 88305 TISSUE EXAM BY PATHOLOGIST: CPT | Mod: 26

## 2023-10-10 PROCEDURE — 88342 IMHCHEM/IMCYTCHM 1ST ANTB: CPT | Mod: 26

## 2023-10-10 RX ORDER — ONDANSETRON 8 MG/1
4 TABLET, FILM COATED ORAL ONCE
Refills: 0 | Status: DISCONTINUED | OUTPATIENT
Start: 2023-10-10 | End: 2023-10-10

## 2023-10-10 RX ORDER — HYDROMORPHONE HYDROCHLORIDE 2 MG/ML
1 INJECTION INTRAMUSCULAR; INTRAVENOUS; SUBCUTANEOUS
Refills: 0 | Status: DISCONTINUED | OUTPATIENT
Start: 2023-10-10 | End: 2023-10-10

## 2023-10-10 RX ORDER — OXYCODONE HYDROCHLORIDE 5 MG/1
5 TABLET ORAL ONCE
Refills: 0 | Status: DISCONTINUED | OUTPATIENT
Start: 2023-10-10 | End: 2023-10-10

## 2023-10-10 RX ORDER — SODIUM CHLORIDE 9 MG/ML
1000 INJECTION, SOLUTION INTRAVENOUS
Refills: 0 | Status: DISCONTINUED | OUTPATIENT
Start: 2023-10-10 | End: 2023-10-10

## 2023-10-10 RX ORDER — HYDROMORPHONE HYDROCHLORIDE 2 MG/ML
0.5 INJECTION INTRAMUSCULAR; INTRAVENOUS; SUBCUTANEOUS
Refills: 0 | Status: DISCONTINUED | OUTPATIENT
Start: 2023-10-10 | End: 2023-10-10

## 2023-10-10 NOTE — PRE-ANESTHESIA EVALUATION ADULT - MALLAMPATI CLASS
Class III - visualization of the soft palate and the base of the uvula O-Z Plasty Text: The defect edges were debeveled with a #15 scalpel blade.  Given the location of the defect, shape of the defect and the proximity to free margins an O-Z plasty (double transposition flap) was deemed most appropriate.  Using a sterile surgical marker, the appropriate transposition flaps were drawn incorporating the defect and placing the expected incisions within the relaxed skin tension lines where possible.    The area thus outlined was incised deep to adipose tissue with a #15 scalpel blade.  The skin margins were undermined to an appropriate distance in all directions utilizing iris scissors.  Hemostasis was achieved with electrocautery.  The flaps were then transposed into place, one clockwise and the other counterclockwise, and anchored with interrupted buried subcutaneous sutures.

## 2023-10-10 NOTE — BRIEF OPERATIVE NOTE - NSICDXBRIEFPREOP_GEN_ALL_CORE_FT
PRE-OP DIAGNOSIS:  Orchialgia 10-Oct-2023 10:26:45  Christin Mckeon   PRE-OP DIAGNOSIS:  Orchialgia 10-Oct-2023 10:26:45 left Christin Mckeon

## 2023-10-10 NOTE — CHART NOTE - NSCHARTNOTEFT_GEN_A_CORE
PACU ANESTHESIA ADMISSION NOTE      Procedure: Orchiectomy, transscrotal, simple  left transscrotal orchiectomy      Post op diagnosis:      ____  Intubated  TV:______       Rate: ______      FiO2: ______    _x___  Patent Airway    _x___  Full return of protective reflexes    _x___  Full recovery from anesthesia / back to baseline status    Vitals:  T(C): 36.7 (10-10-23 @ 08:13), Max: 36.7 (10-10-23 @ 07:29)  HR: 61 (10-10-23 @ 08:13) (61 - 61)  BP: 132/77 (10-10-23 @ 08:13) (132/77 - 132/77)  RR: 16 (10-10-23 @ 08:13) (16 - 16)  SpO2: 100% (10-10-23 @ 08:13) (100% - 100%)    Mental Status:  _x___ Awake   _____ Alert   _____ Drowsy   _____ Sedated    Nausea/Vomiting:  _x___  NO       ______Yes,   See Post - Op Orders         Pain Scale (0-10):  __0___    Treatment: _x___ None    ____ See Post - Op/PCA Orders    Post - Operative Fluids:   __x__ Oral   ____ See Post - Op Orders    Plan: Discharge:   _x___Home       _____Floor     _____Critical Care    _____  Other:_________________    Comments:  No anesthesia issues or complications noted.  Discharge when criteria met.

## 2023-10-10 NOTE — ASU DISCHARGE PLAN (ADULT/PEDIATRIC) - ASU DC SPECIAL INSTRUCTIONSFT
Patient is to keep dressing dry for 3 days, can shower after 3 days, Bath after 1 week   Can take tylenol every 4-6 hours as needed for pain control   Patient is to follow-up with Dr. Moreno as outpatient in 2 weeks Patient is to keep dressing dry for 3 days, can shower after 3 days, Bath after 1 week   Can take tylenol every 4-6 hours as needed for pain control today  advil or the like tomorrow  Patient is to follow-up with Dr. Moreno as outpatient in 2 - 3 weeks

## 2023-10-10 NOTE — BRIEF OPERATIVE NOTE - COMMENTS
I, Christin Mckeon PA-C, performed the duties of first assist during the above listed surgery which includes, but is not limited to, retraction, suctioning and suturing. There was no competent resident available for the case.

## 2023-10-10 NOTE — BRIEF OPERATIVE NOTE - NSICDXBRIEFPROCEDURE_GEN_ALL_CORE_FT
PROCEDURES:  Orchiectomy, transscrotal, simple 10-Oct-2023 10:27:24 left transscrotal orchiectomy Christin Mckeon

## 2023-10-10 NOTE — ASU DISCHARGE PLAN (ADULT/PEDIATRIC) - CARE PROVIDER_API CALL
Hien Moreno.  Urology  [address] [place] [address], [address] [address]  Phone: [phone]  Fax: [phone]  Follow Up Time:

## 2023-10-13 DIAGNOSIS — N50.812 LEFT TESTICULAR PAIN: ICD-10-CM

## 2023-10-22 LAB
SURGICAL PATHOLOGY STUDY: SIGNIFICANT CHANGE UP
SURGICAL PATHOLOGY STUDY: SIGNIFICANT CHANGE UP

## 2023-11-07 PROBLEM — N50.9 DISORDER OF MALE GENITAL ORGANS, UNSPECIFIED: Chronic | Status: ACTIVE | Noted: 2023-09-22

## 2023-11-10 ENCOUNTER — APPOINTMENT (OUTPATIENT)
Dept: UROLOGY | Facility: CLINIC | Age: 63
End: 2023-11-10
Payer: MEDICARE

## 2023-11-10 VITALS
HEART RATE: 60 BPM | HEIGHT: 67 IN | WEIGHT: 233 LBS | BODY MASS INDEX: 36.57 KG/M2 | SYSTOLIC BLOOD PRESSURE: 115 MMHG | DIASTOLIC BLOOD PRESSURE: 71 MMHG

## 2023-11-10 PROCEDURE — 99214 OFFICE O/P EST MOD 30 MIN: CPT | Mod: 24

## 2023-12-21 ENCOUNTER — APPOINTMENT (OUTPATIENT)
Dept: UROLOGY | Facility: CLINIC | Age: 63
End: 2023-12-21
Payer: MEDICARE

## 2023-12-21 VITALS
SYSTOLIC BLOOD PRESSURE: 115 MMHG | HEIGHT: 67 IN | BODY MASS INDEX: 38.45 KG/M2 | HEART RATE: 66 BPM | WEIGHT: 245 LBS | DIASTOLIC BLOOD PRESSURE: 77 MMHG | TEMPERATURE: 98 F

## 2023-12-21 DIAGNOSIS — N50.812 LEFT TESTICULAR PAIN: ICD-10-CM

## 2023-12-21 DIAGNOSIS — R10.32 LEFT LOWER QUADRANT PAIN: ICD-10-CM

## 2023-12-21 PROCEDURE — 99214 OFFICE O/P EST MOD 30 MIN: CPT | Mod: 24

## 2023-12-21 NOTE — HISTORY OF PRESENT ILLNESS
[Currently Experiencing ___] :  [unfilled] [FreeTextEntry1] : Madison is a 62-year-old male born August 23, 1960 who we have been following for severe left testicular pain with imaging that demonstrates chronic ischemia/infarction of the left testicle.  He is status post left orchiectomy on 10/10/2023.  Male with decreased libido.  He states he is obtaining good erections.  Presents today to review his hormone panel.  Blood work from 12/5/2023: Liver function testing within normal limits CBC within normal limits LH 11.8 Estradiol 21 Total testosterone 212 Free 48.5  Bioavailable not calculated.

## 2023-12-21 NOTE — LETTER HEADER
[FreeTextEntry3] : Hien Moreno M.D. [address] [place] [address], [address] [place] [address]

## 2023-12-21 NOTE — LETTER BODY
[Dear  ___] : Dear  [unfilled], [Courtesy Letter:] : I had the pleasure of seeing your patient, [unfilled], in my office today. [Please see my note below.] : Please see my note below. [Sincerely,] : Sincerely, [FreeTextEntry2] : Bob Castellanos MD [address] [address]
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
## 2024-01-09 ENCOUNTER — NON-APPOINTMENT (OUTPATIENT)
Age: 64
End: 2024-01-09

## 2024-01-09 LAB — ESTRADIOL SERPL-MCNC: 15 PG/ML

## 2024-01-11 ENCOUNTER — NON-APPOINTMENT (OUTPATIENT)
Age: 64
End: 2024-01-11

## 2024-01-11 LAB
LH SERPL-ACNC: 14.4 IU/L
SHBG SERPL-SCNC: 22.8 NMOL/L

## 2024-01-16 ENCOUNTER — NON-APPOINTMENT (OUTPATIENT)
Age: 64
End: 2024-01-16

## 2024-01-16 LAB
TESTOSTERONE FREE/WEAKLY BND: 50 NG/DL
TESTOSTERONE TOTAL S: 210 NG/DL
TESTOSTERONE, % FREE/WEAKLY BND: 23.8 %

## 2024-01-18 ENCOUNTER — APPOINTMENT (OUTPATIENT)
Dept: NEUROSURGERY | Facility: CLINIC | Age: 64
End: 2024-01-18

## 2024-02-02 ENCOUNTER — APPOINTMENT (OUTPATIENT)
Dept: UROLOGY | Facility: CLINIC | Age: 64
End: 2024-02-02
Payer: MEDICARE

## 2024-02-02 VITALS
WEIGHT: 237 LBS | SYSTOLIC BLOOD PRESSURE: 120 MMHG | TEMPERATURE: 97.2 F | DIASTOLIC BLOOD PRESSURE: 82 MMHG | BODY MASS INDEX: 37.2 KG/M2 | HEIGHT: 67 IN | HEART RATE: 78 BPM

## 2024-02-02 PROCEDURE — G2211 COMPLEX E/M VISIT ADD ON: CPT

## 2024-02-02 PROCEDURE — 99214 OFFICE O/P EST MOD 30 MIN: CPT

## 2024-02-02 NOTE — LETTER BODY
[Dear  ___] : Dear  [unfilled], [Courtesy Letter:] : I had the pleasure of seeing your patient, [unfilled], in my office today. [Please see my note below.] : Please see my note below. [Consult Closing:] : Thank you very much for allowing me to participate in the care of this patient.  If you have any questions, please do not hesitate to contact me. [Sincerely,] : Sincerely, [FreeTextEntry2] : Bob Castellanos MD [address] [address]

## 2024-02-02 NOTE — LETTER HEADER
[FreeTextEntry3] : Hien Moreno MD [address] [place] [address], [address] [place] [id number]

## 2024-02-02 NOTE — ASSESSMENT
[FreeTextEntry1] : I am concerned as he only has 1 testicle and we do not know why his left total infarcted and if there is some hematological pathology that is causing increased coagulability not just worried for his testicle but for other body parts.  I am going to recommend he see a hematologist while at the same time I am going to get an ultrasound to look at the blood flow to the right testicle.  If I am being alarmed is that the right testicular discomfort is referred from the sacroiliac joint then so be it but I would rather be a little preemptive then ignore this and then find that he indeed has a coagulopathy and either loses his remaining testicle or ends up infarcting other more serious body parts  As far as the testosterone his bioavailable less than the 5th percentile.  His total is low but that may be because his sex hormone binding globulin is at the lower end of normal.  We have 2 options here.  #1 wait and see if he indeed has poor flow to his right testicle.  Even if he does not with his LH up to 14 he is already showing signs of Leydig cell failure.  If there is poor flow to the right testicle we can try and see if the hematologist can fix it if it is fixed and his hormone level gets better then all well and good.  2.  We can start to treat him while working up his coagulability and the flow to the right testicle.  However, raising someone's testosterone in healthy patients increases the coagulability and if he already has a predisposition then making his testosterone into the mid range.  Total or bioavailable may increase that risk.  Coupled with the fact that his bioavailable is normal the way acknowledge it is at the lower end of normal and not sure that rushing to start him on testosterone is appropriate.  I am therefore inclined to wait and see what the hematologist has to say as well as if the ultrasound indeed shows low flow.  If there is no increased coagulability and the flow to the right testicle is normal we can then discuss whether or not we should start him on testosterone replacement and if we do what modality.  At this point I be inclined to do testosterone cypionate in the subcutaneous fashion but we will discuss all of the options when we meet again  To summarize  semiurgent scrotal ultrasound,  a consult to hematology and if he cannot be seen, I will make a phone call and try and get him in sooner rather than later If he ends up with increased pain in the right testicle he is to call us and go to the emergency room  Follow-up after the tests

## 2024-02-02 NOTE — PHYSICAL EXAM
[General Appearance - Well Developed] : well developed [General Appearance - Well Nourished] : well nourished [Normal Appearance] : normal appearance [Well Groomed] : well groomed [General Appearance - In No Acute Distress] : no acute distress [Edema] : no peripheral edema [Respiration, Rhythm And Depth] : normal respiratory rhythm and effort [Exaggerated Use Of Accessory Muscles For Inspiration] : no accessory muscle use [Abdomen Soft] : soft [Abdomen Tenderness] : non-tender [Costovertebral Angle Tenderness] : no ~M costovertebral angle tenderness [Urinary Bladder Findings] : the bladder was normal on palpation [Normal Station and Gait] : the gait and station were normal for the patient's age [] : no rash [No Focal Deficits] : no focal deficits [Oriented To Time, Place, And Person] : oriented to person, place, and time [Affect] : the affect was normal [Mood] : the mood was normal [Not Anxious] : not anxious [FreeTextEntry1] : bmi = 37.1 [de-identified] : The right testicle has some changes.  There is a little more globular it is slightly more firm than I remember and there was 1 point where was tender though what I adjusted the testicle and examined this further there was no tenderness.  He does have some mild right SI joint tenderness but I think the discomfort in the testicle is more than I expect from referred pain from sacroiliac joint inflammation

## 2024-02-13 ENCOUNTER — RESULT REVIEW (OUTPATIENT)
Age: 64
End: 2024-02-13

## 2024-02-13 ENCOUNTER — OUTPATIENT (OUTPATIENT)
Dept: OUTPATIENT SERVICES | Facility: HOSPITAL | Age: 64
LOS: 1 days | End: 2024-02-13
Payer: MEDICARE

## 2024-02-13 DIAGNOSIS — Z00.8 ENCOUNTER FOR OTHER GENERAL EXAMINATION: ICD-10-CM

## 2024-02-13 DIAGNOSIS — Z98.890 OTHER SPECIFIED POSTPROCEDURAL STATES: Chronic | ICD-10-CM

## 2024-02-13 DIAGNOSIS — Z98.52 VASECTOMY STATUS: Chronic | ICD-10-CM

## 2024-02-13 DIAGNOSIS — N50.811 RIGHT TESTICULAR PAIN: ICD-10-CM

## 2024-02-13 PROCEDURE — 76870 US EXAM SCROTUM: CPT | Mod: 26

## 2024-02-13 PROCEDURE — 76870 US EXAM SCROTUM: CPT

## 2024-02-14 DIAGNOSIS — N50.811 RIGHT TESTICULAR PAIN: ICD-10-CM

## 2024-02-20 ENCOUNTER — OUTPATIENT (OUTPATIENT)
Dept: OUTPATIENT SERVICES | Facility: HOSPITAL | Age: 64
LOS: 1 days | End: 2024-02-20
Payer: MEDICARE

## 2024-02-20 ENCOUNTER — APPOINTMENT (OUTPATIENT)
Dept: HEMATOLOGY ONCOLOGY | Facility: CLINIC | Age: 64
End: 2024-02-20
Payer: MEDICARE

## 2024-02-20 VITALS
HEIGHT: 67 IN | SYSTOLIC BLOOD PRESSURE: 144 MMHG | TEMPERATURE: 98 F | BODY MASS INDEX: 37.67 KG/M2 | HEART RATE: 69 BPM | WEIGHT: 240 LBS | DIASTOLIC BLOOD PRESSURE: 83 MMHG

## 2024-02-20 DIAGNOSIS — Z98.52 VASECTOMY STATUS: Chronic | ICD-10-CM

## 2024-02-20 DIAGNOSIS — D68.59 OTHER PRIMARY THROMBOPHILIA: ICD-10-CM

## 2024-02-20 DIAGNOSIS — Z98.890 OTHER SPECIFIED POSTPROCEDURAL STATES: Chronic | ICD-10-CM

## 2024-02-20 LAB
ALBUMIN SERPL ELPH-MCNC: 4.6 G/DL
ALP BLD-CCNC: 97 U/L
ALT SERPL-CCNC: 23 U/L
ANION GAP SERPL CALC-SCNC: 9 MMOL/L
APTT BLD: 33.7 SEC
AST SERPL-CCNC: 19 U/L
BASOPHILS # BLD AUTO: 0.05 K/UL
BASOPHILS NFR BLD AUTO: 0.7 %
BILIRUB DIRECT SERPL-MCNC: <0.2 MG/DL
BILIRUB INDIRECT SERPL-MCNC: >0.4 MG/DL
BILIRUB SERPL-MCNC: 0.6 MG/DL
BUN SERPL-MCNC: 20 MG/DL
CALCIUM SERPL-MCNC: 9.4 MG/DL
CHLORIDE SERPL-SCNC: 105 MMOL/L
CO2 SERPL-SCNC: 26 MMOL/L
CREAT SERPL-MCNC: 0.9 MG/DL
EGFR: 97 ML/MIN/1.73M2
EOSINOPHIL # BLD AUTO: 0.25 K/UL
EOSINOPHIL NFR BLD AUTO: 3.5 %
GLUCOSE SERPL-MCNC: 94 MG/DL
HCT VFR BLD CALC: 41.3 %
HGB BLD-MCNC: 13.7 G/DL
IMM GRANULOCYTES NFR BLD AUTO: 0.4 %
INR PPP: 0.96 RATIO
LYMPHOCYTES # BLD AUTO: 2.09 K/UL
LYMPHOCYTES NFR BLD AUTO: 29.5 %
MAN DIFF?: NORMAL
MCHC RBC-ENTMCNC: 30 PG
MCHC RBC-ENTMCNC: 33.2 G/DL
MCV RBC AUTO: 90.6 FL
MONOCYTES # BLD AUTO: 0.47 K/UL
MONOCYTES NFR BLD AUTO: 6.6 %
NEUTROPHILS # BLD AUTO: 4.2 K/UL
NEUTROPHILS NFR BLD AUTO: 59.3 %
PLATELET # BLD AUTO: 290 K/UL
PMV BLD AUTO: 0 /100 WBCS
POTASSIUM SERPL-SCNC: 4.7 MMOL/L
PROT SERPL-MCNC: 7.4 G/DL
PT BLD: 10.9 SEC
RBC # BLD: 4.56 M/UL
RBC # FLD: 13.2 %
SODIUM SERPL-SCNC: 140 MMOL/L
WBC # FLD AUTO: 7.09 K/UL

## 2024-02-20 PROCEDURE — 85303 CLOT INHIBIT PROT C ACTIVITY: CPT

## 2024-02-20 PROCEDURE — 85610 PROTHROMBIN TIME: CPT

## 2024-02-20 PROCEDURE — 85613 RUSSELL VIPER VENOM DILUTED: CPT

## 2024-02-20 PROCEDURE — 99204 OFFICE O/P NEW MOD 45 MIN: CPT

## 2024-02-20 PROCEDURE — 85598 HEXAGNAL PHOSPH PLTLT NEUTRL: CPT

## 2024-02-20 PROCEDURE — 86146 BETA-2 GLYCOPROTEIN ANTIBODY: CPT

## 2024-02-20 PROCEDURE — 80076 HEPATIC FUNCTION PANEL: CPT

## 2024-02-20 PROCEDURE — 81241 F5 GENE: CPT

## 2024-02-20 PROCEDURE — 85027 COMPLETE CBC AUTOMATED: CPT

## 2024-02-20 PROCEDURE — 85730 THROMBOPLASTIN TIME PARTIAL: CPT

## 2024-02-20 PROCEDURE — 80048 BASIC METABOLIC PNL TOTAL CA: CPT

## 2024-02-20 PROCEDURE — G0452: CPT | Mod: 26

## 2024-02-20 PROCEDURE — 85306 CLOT INHIBIT PROT S FREE: CPT

## 2024-02-20 PROCEDURE — 86147 CARDIOLIPIN ANTIBODY EA IG: CPT

## 2024-02-20 PROCEDURE — 81270 JAK2 GENE: CPT

## 2024-02-20 PROCEDURE — 81240 F2 GENE: CPT

## 2024-02-20 NOTE — ASSESSMENT
[FreeTextEntry1] : # Chronic ischemia/infarction of the left testicle, dx 08/2023 - followup with URO, Dr. Moreno, as scheduled - will obtain hypercoagulability w/u - consider vascular consult

## 2024-02-20 NOTE — HISTORY OF PRESENT ILLNESS
[de-identified] : Mr. DENNIS MORALES is a 63 year old male here today for evaluation and management of ischemia/infarction of the left testicle.     DENNIS is a 63 year old M with PMHx including hypercholesterolemia, who presents to clinic to establish care.     He is presently feeling well with no new complaints.  Patient denies chest pain, palpitations, dyspnea, unintentional weight loss or bleeding.  Patient reports family history of malignancy or blood/clotting disorder.     RADIOLOGIC WORKUP US Scrotum (2.14.2024) IMPRESSION:1. Diffusely heterogeneous echotexture to the left testicle with cystic change, an abnormal finding. Vascular flow is identified, but is diminished.2. Left epididymal cysts measuring up to 0.8 cm.3. No testicle is visualized in the right scrotal sac. US Scrotum (8.15.2023) IMPRESSION:Normal-appearing right testicle with documented flow.Findings compatible with infarct of the left testicle with few areas of peripheral flow.Trace right hydrocele and bilateral epididymal cysts. MR Pelvis (8.14.2023) IMPRESSION:Chronic ischemia/infarction of the left testicle.Small fat-containing left inguinal hernia (8/12).Few left paratesticular cysts measure up to 8 mm possibly representing epididymal cysts. US Scrotum (7.8.2023) IMPRESSION:Septated cystic structure measuring 1.1 x 1.4 x 0.6 cm adjacent to the left testicle and incompletely characterized on ultrasound. Consider MRI with contrast for further characterization.Bilateral varicoceles measuring 2 mm.Right hydrocele with approximate volume of 7 mL.  LAB WORKUP (9.22.2023) WBC 8.26, Hgb 14.2, , Cr 1.2, eGFR 68, normal LFTs, hematuria (-), PT 10.10, INR 0.89, aPTT 33.2   PATHOLOGY (10.22.2023) Left Testicle: - Benign testicular parenchyma with vascular congestion. - Adjacent vessels showing vascular congestion and some    vascular lumen filled with thrombus   (see Comment).  HCM Colonoscopy? Upper Endoscopy?

## 2024-02-20 NOTE — REASON FOR VISIT
[Initial Consultation] : an initial consultation for [FreeTextEntry2] : ischemia/infarction of the left testicle

## 2024-02-21 DIAGNOSIS — D68.59 OTHER PRIMARY THROMBOPHILIA: ICD-10-CM

## 2024-02-21 LAB
CONFIRM: NORMAL
DRVVT IMM 1:2 NP PPP: NORMAL
DRVVT SCREEN TO CONFIRM RATIO: 0.99 RATIO
PROT C PPP CHRO-ACNC: 127 %
PROT S AG ACT/NOR PPP IA: 112 %
SCREEN DRVVT: NORMAL
SILICA CLOTTING TIME INTERPRETATION: NORMAL
SILICA CLOTTING TIME S/C: 1.09 RATIO

## 2024-02-22 LAB
B2 GLYCOPROT1 IGG SER-ACNC: <5 SGU
B2 GLYCOPROT1 IGM SER-ACNC: <5 SMU
CARDIOLIPIN IGM SER-MCNC: <5 GPL
CARDIOLIPIN IGM SER-MCNC: <5 MPL

## 2024-02-23 LAB
DNA PLOIDY SPEC FC-IMP: NORMAL
PTR INTERP: NORMAL

## 2024-02-27 LAB
JAK2 P.V617F BLD/T QL: NORMAL
REFLEX:: NORMAL

## 2024-03-04 ENCOUNTER — APPOINTMENT (OUTPATIENT)
Dept: UROLOGY | Facility: CLINIC | Age: 64
End: 2024-03-04
Payer: MEDICARE

## 2024-03-04 VITALS
DIASTOLIC BLOOD PRESSURE: 100 MMHG | WEIGHT: 235 LBS | HEART RATE: 55 BPM | OXYGEN SATURATION: 97 % | SYSTOLIC BLOOD PRESSURE: 155 MMHG | HEIGHT: 67 IN | BODY MASS INDEX: 36.88 KG/M2

## 2024-03-04 DIAGNOSIS — N50.1 VASCULAR DISORDERS OF MALE GENITAL ORGANS: ICD-10-CM

## 2024-03-04 DIAGNOSIS — R79.89 OTHER SPECIFIED ABNORMAL FINDINGS OF BLOOD CHEMISTRY: ICD-10-CM

## 2024-03-04 PROCEDURE — G2211 COMPLEX E/M VISIT ADD ON: CPT

## 2024-03-04 PROCEDURE — 99215 OFFICE O/P EST HI 40 MIN: CPT

## 2024-03-04 NOTE — HISTORY OF PRESENT ILLNESS
[FreeTextEntry1] : Anthony is a 63-year-old male I saw her last February 2, 2024 he had severe left testicular pain with imaging that showed chronic ischemic infarction we took out the left testicle the vascular showed blood vessels filled with clot and the preoperative ultrasound had showed normal blood flow to the right side.  Blood test in December 2023 showed normal total and free testosterone so we elected to repeat the labs to check his bioavailable which though low normal was normal.  His LH was up to 14.4 and clot implying Leydig cell insufficiency and he may eventually have to go on testosterone.  The new problem was that he now had pain on the right testicle and it was not as bad as the left but it was not insignificant.  We sent him for an ultrasound which I have discussed with the radiologist real-time their technician felt that the right testicle had been removed and the left testicle that was present was still showing poor blood flow.  I am speaking to the patient today and he told her that the left testicle had been removed I had examined him and the left testicle had been removed.  Regardless the report is as it is.  [Fatigue] : fatigue [3] : 3 [de-identified] : right testicle

## 2024-03-04 NOTE — PHYSICAL EXAM
[General Appearance - Well Developed] : well developed [General Appearance - Well Nourished] : well nourished [Normal Appearance] : normal appearance [Well Groomed] : well groomed [Edema] : no peripheral edema [General Appearance - In No Acute Distress] : no acute distress [Exaggerated Use Of Accessory Muscles For Inspiration] : no accessory muscle use [Respiration, Rhythm And Depth] : normal respiratory rhythm and effort [Abdomen Soft] : soft [Abdomen Tenderness] : non-tender [Costovertebral Angle Tenderness] : no ~M costovertebral angle tenderness [Urinary Bladder Findings] : the bladder was normal on palpation [Normal Station and Gait] : the gait and station were normal for the patient's age [] : no rash [No Focal Deficits] : no focal deficits [Affect] : the affect was normal [Oriented To Time, Place, And Person] : oriented to person, place, and time [Not Anxious] : not anxious [Mood] : the mood was normal [FreeTextEntry1] : bmi = 36.81 [de-identified] : I examined him again given the radiologic findings.  It is definitely the right testicle, it is even more globular than the last time it is more tender than I would like but less than the left side was when I met him.

## 2024-03-04 NOTE — LETTER BODY
[Dear  ___] : Dear  [unfilled], [Courtesy Letter:] : I had the pleasure of seeing your patient, [unfilled], in my office today. [Please see my note below.] : Please see my note below. [Consult Closing:] : Thank you very much for allowing me to participate in the care of this patient.  If you have any questions, please do not hesitate to contact me. [Sincerely,] : Sincerely, [FreeTextEntry2] : Bob Castellanos MD [address] [address]
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
## 2024-03-04 NOTE — LETTER HEADER
[FreeTextEntry3] : Hien Moreno MD [facility], [address] [place] [address]

## 2024-03-12 ENCOUNTER — APPOINTMENT (OUTPATIENT)
Dept: VASCULAR SURGERY | Facility: CLINIC | Age: 64
End: 2024-03-12
Payer: MEDICARE

## 2024-03-12 VITALS — SYSTOLIC BLOOD PRESSURE: 124 MMHG | DIASTOLIC BLOOD PRESSURE: 82 MMHG | HEART RATE: 71 BPM

## 2024-03-12 VITALS — WEIGHT: 232 LBS | HEIGHT: 67 IN | BODY MASS INDEX: 36.41 KG/M2

## 2024-03-12 DIAGNOSIS — I77.1 STRICTURE OF ARTERY: ICD-10-CM

## 2024-03-12 PROCEDURE — 93978 VASCULAR STUDY: CPT

## 2024-03-12 PROCEDURE — 99204 OFFICE O/P NEW MOD 45 MIN: CPT

## 2024-03-12 NOTE — DATA REVIEWED
[FreeTextEntry1] : Aortoiliac duplex performed no evidence of aortoiliac stenosis.  Limited study secondary by bowel habitus unable to visualize his hypogastric arteries.

## 2024-03-12 NOTE — ASSESSMENT
[FreeTextEntry1] : The patient is a 63-year-old male who underwent a left orchiectomy for an infarcted left testicle.  The patient recently underwent a pelvic ultrasound and was noted to have decreased flow to his right testicle.  The patient was sent here for evaluation of his arterial hemodynamics of his pelvic arteries.  I performed a aortoiliac duplex which showed no evidence of aortoiliac stenosis.  His hypogastric arteries were not visualized secondary to abdominal habitus.  I would like to send the patient for a CTA of his abdomen pelvis to evaluate his pelvic anatomy for hypogastric artery stenosis.  I will see the patient back in my office after the CTA is performed.  I am sending him for a BUN and creatinine prior to the study.  I, Dr. Ming Rivera, personally performed the evaluation and management (E/M) services for this new patient. That E/M includes conducting the clinically appropriate initial history &/or exam, assessing all conditions, and establishing the plan of care. Today, my RILEY, Katie Blair PA-C, was here to observe my evaluation and management service for this patient & follow plan of care established by me going forward.

## 2024-03-12 NOTE — HISTORY OF PRESENT ILLNESS
[FreeTextEntry1] : The patient is a 63-year-old male who underwent a left orchiectomy for an infarcted left testicle.  The patient recently underwent a pelvic ultrasound and was noted to have decreased flow to his right testicle.  The patient was sent here for evaluation of his arterial hemodynamics of his pelvic arteries.

## 2024-03-13 LAB
BUN SERPL-MCNC: 29 MG/DL
CREAT SERPL-MCNC: 1.1 MG/DL
EGFR: 76 ML/MIN/1.73M2

## 2024-03-21 ENCOUNTER — OUTPATIENT (OUTPATIENT)
Dept: OUTPATIENT SERVICES | Facility: HOSPITAL | Age: 64
LOS: 1 days | End: 2024-03-21
Payer: MEDICARE

## 2024-03-21 ENCOUNTER — APPOINTMENT (OUTPATIENT)
Dept: HEMATOLOGY ONCOLOGY | Facility: CLINIC | Age: 64
End: 2024-03-21
Payer: MEDICARE

## 2024-03-21 ENCOUNTER — EMERGENCY (EMERGENCY)
Facility: HOSPITAL | Age: 64
LOS: 0 days | Discharge: HOME | End: 2024-03-21
Attending: EMERGENCY MEDICINE
Payer: MEDICARE

## 2024-03-21 VITALS
OXYGEN SATURATION: 98 % | TEMPERATURE: 98 F | SYSTOLIC BLOOD PRESSURE: 142 MMHG | HEART RATE: 71 BPM | RESPIRATION RATE: 18 BRPM | DIASTOLIC BLOOD PRESSURE: 87 MMHG

## 2024-03-21 VITALS
WEIGHT: 230 LBS | SYSTOLIC BLOOD PRESSURE: 126 MMHG | BODY MASS INDEX: 36.1 KG/M2 | TEMPERATURE: 96.3 F | RESPIRATION RATE: 16 BRPM | HEART RATE: 70 BPM | DIASTOLIC BLOOD PRESSURE: 92 MMHG | HEIGHT: 67 IN

## 2024-03-21 DIAGNOSIS — Z98.890 OTHER SPECIFIED POSTPROCEDURAL STATES: Chronic | ICD-10-CM

## 2024-03-21 DIAGNOSIS — R79.89 OTHER SPECIFIED ABNORMAL FINDINGS OF BLOOD CHEMISTRY: ICD-10-CM

## 2024-03-21 DIAGNOSIS — N50.811 RIGHT TESTICULAR PAIN: ICD-10-CM

## 2024-03-21 DIAGNOSIS — Z98.52 VASECTOMY STATUS: Chronic | ICD-10-CM

## 2024-03-21 DIAGNOSIS — E78.00 PURE HYPERCHOLESTEROLEMIA, UNSPECIFIED: ICD-10-CM

## 2024-03-21 DIAGNOSIS — Z90.89 ACQUIRED ABSENCE OF OTHER ORGANS: ICD-10-CM

## 2024-03-21 DIAGNOSIS — Z87.438 PERSONAL HISTORY OF OTHER DISEASES OF MALE GENITAL ORGANS: ICD-10-CM

## 2024-03-21 DIAGNOSIS — N50.819 TESTICULAR PAIN, UNSPECIFIED: ICD-10-CM

## 2024-03-21 DIAGNOSIS — I77.1 STRICTURE OF ARTERY: ICD-10-CM

## 2024-03-21 LAB
ALBUMIN SERPL ELPH-MCNC: 4.5 G/DL — SIGNIFICANT CHANGE UP (ref 3.5–5.2)
ALP SERPL-CCNC: 93 U/L — SIGNIFICANT CHANGE UP (ref 30–115)
ALT FLD-CCNC: 25 U/L — SIGNIFICANT CHANGE UP (ref 0–41)
ANION GAP SERPL CALC-SCNC: 10 MMOL/L — SIGNIFICANT CHANGE UP (ref 7–14)
APPEARANCE UR: CLEAR — SIGNIFICANT CHANGE UP
AST SERPL-CCNC: 20 U/L — SIGNIFICANT CHANGE UP (ref 0–41)
BASOPHILS # BLD AUTO: 0.04 K/UL — SIGNIFICANT CHANGE UP (ref 0–0.2)
BASOPHILS NFR BLD AUTO: 0.6 % — SIGNIFICANT CHANGE UP (ref 0–1)
BILIRUB SERPL-MCNC: 0.7 MG/DL — SIGNIFICANT CHANGE UP (ref 0.2–1.2)
BILIRUB UR-MCNC: NEGATIVE — SIGNIFICANT CHANGE UP
BUN SERPL-MCNC: 19 MG/DL — SIGNIFICANT CHANGE UP (ref 10–20)
CALCIUM SERPL-MCNC: 9.5 MG/DL — SIGNIFICANT CHANGE UP (ref 8.4–10.5)
CHLORIDE SERPL-SCNC: 99 MMOL/L — SIGNIFICANT CHANGE UP (ref 98–110)
CO2 SERPL-SCNC: 26 MMOL/L — SIGNIFICANT CHANGE UP (ref 17–32)
COLOR SPEC: YELLOW — SIGNIFICANT CHANGE UP
CREAT SERPL-MCNC: 1 MG/DL — SIGNIFICANT CHANGE UP (ref 0.7–1.5)
DIFF PNL FLD: NEGATIVE — SIGNIFICANT CHANGE UP
EGFR: 85 ML/MIN/1.73M2 — SIGNIFICANT CHANGE UP
EOSINOPHIL # BLD AUTO: 0.25 K/UL — SIGNIFICANT CHANGE UP (ref 0–0.7)
EOSINOPHIL NFR BLD AUTO: 3.7 % — SIGNIFICANT CHANGE UP (ref 0–8)
GLUCOSE SERPL-MCNC: 97 MG/DL — SIGNIFICANT CHANGE UP (ref 70–99)
GLUCOSE UR QL: NEGATIVE MG/DL — SIGNIFICANT CHANGE UP
HCT VFR BLD CALC: 39.8 % — LOW (ref 42–52)
HGB BLD-MCNC: 13.5 G/DL — LOW (ref 14–18)
IMM GRANULOCYTES NFR BLD AUTO: 0.3 % — SIGNIFICANT CHANGE UP (ref 0.1–0.3)
KETONES UR-MCNC: NEGATIVE MG/DL — SIGNIFICANT CHANGE UP
LEUKOCYTE ESTERASE UR-ACNC: NEGATIVE — SIGNIFICANT CHANGE UP
LYMPHOCYTES # BLD AUTO: 2.04 K/UL — SIGNIFICANT CHANGE UP (ref 1.2–3.4)
LYMPHOCYTES # BLD AUTO: 30 % — SIGNIFICANT CHANGE UP (ref 20.5–51.1)
MCHC RBC-ENTMCNC: 30.1 PG — SIGNIFICANT CHANGE UP (ref 27–31)
MCHC RBC-ENTMCNC: 33.9 G/DL — SIGNIFICANT CHANGE UP (ref 32–37)
MCV RBC AUTO: 88.8 FL — SIGNIFICANT CHANGE UP (ref 80–94)
MONOCYTES # BLD AUTO: 0.44 K/UL — SIGNIFICANT CHANGE UP (ref 0.1–0.6)
MONOCYTES NFR BLD AUTO: 6.5 % — SIGNIFICANT CHANGE UP (ref 1.7–9.3)
NEUTROPHILS # BLD AUTO: 4 K/UL — SIGNIFICANT CHANGE UP (ref 1.4–6.5)
NEUTROPHILS NFR BLD AUTO: 58.9 % — SIGNIFICANT CHANGE UP (ref 42.2–75.2)
NITRITE UR-MCNC: NEGATIVE — SIGNIFICANT CHANGE UP
NRBC # BLD: 0 /100 WBCS — SIGNIFICANT CHANGE UP (ref 0–0)
PH UR: 6 — SIGNIFICANT CHANGE UP (ref 5–8)
PLATELET # BLD AUTO: 246 K/UL — SIGNIFICANT CHANGE UP (ref 130–400)
PMV BLD: 10.3 FL — SIGNIFICANT CHANGE UP (ref 7.4–10.4)
POTASSIUM SERPL-MCNC: 4.4 MMOL/L — SIGNIFICANT CHANGE UP (ref 3.5–5)
POTASSIUM SERPL-SCNC: 4.4 MMOL/L — SIGNIFICANT CHANGE UP (ref 3.5–5)
PROT SERPL-MCNC: 7.5 G/DL — SIGNIFICANT CHANGE UP (ref 6–8)
PROT UR-MCNC: NEGATIVE MG/DL — SIGNIFICANT CHANGE UP
RBC # BLD: 4.48 M/UL — LOW (ref 4.7–6.1)
RBC # FLD: 13 % — SIGNIFICANT CHANGE UP (ref 11.5–14.5)
SODIUM SERPL-SCNC: 135 MMOL/L — SIGNIFICANT CHANGE UP (ref 135–146)
SP GR SPEC: >1.03 — HIGH (ref 1–1.03)
UROBILINOGEN FLD QL: 0.2 MG/DL — SIGNIFICANT CHANGE UP (ref 0.2–1)
WBC # BLD: 6.79 K/UL — SIGNIFICANT CHANGE UP (ref 4.8–10.8)
WBC # FLD AUTO: 6.79 K/UL — SIGNIFICANT CHANGE UP (ref 4.8–10.8)

## 2024-03-21 PROCEDURE — 36415 COLL VENOUS BLD VENIPUNCTURE: CPT

## 2024-03-21 PROCEDURE — 76870 US EXAM SCROTUM: CPT

## 2024-03-21 PROCEDURE — 74174 CTA ABD&PLVS W/CONTRAST: CPT | Mod: 26,MC

## 2024-03-21 PROCEDURE — 99214 OFFICE O/P EST MOD 30 MIN: CPT

## 2024-03-21 PROCEDURE — 99284 EMERGENCY DEPT VISIT MOD MDM: CPT | Mod: 25

## 2024-03-21 PROCEDURE — 80053 COMPREHEN METABOLIC PANEL: CPT

## 2024-03-21 PROCEDURE — 85025 COMPLETE CBC W/AUTO DIFF WBC: CPT

## 2024-03-21 PROCEDURE — 81003 URINALYSIS AUTO W/O SCOPE: CPT

## 2024-03-21 PROCEDURE — 87086 URINE CULTURE/COLONY COUNT: CPT

## 2024-03-21 PROCEDURE — 76870 US EXAM SCROTUM: CPT | Mod: 26

## 2024-03-21 PROCEDURE — 74174 CTA ABD&PLVS W/CONTRAST: CPT | Mod: MC

## 2024-03-21 PROCEDURE — 99285 EMERGENCY DEPT VISIT HI MDM: CPT | Mod: FS

## 2024-03-21 RX ORDER — ASPIRIN/CALCIUM CARB/MAGNESIUM 324 MG
81 TABLET ORAL ONCE
Refills: 0 | Status: COMPLETED | OUTPATIENT
Start: 2024-03-21 | End: 2024-03-21

## 2024-03-21 RX ADMIN — Medication 81 MILLIGRAM(S): at 16:30

## 2024-03-21 NOTE — CONSULT NOTE ADULT - ASSESSMENT
Pt is a 69-year-old male with medical history of left testicular infarct status post resection by Dr. Moreno few months ago, sent in from hematology for expedited testing.     Plan:  - US Scrotum: Absent right testicle. Left epididymal head cyst, as seen on prior studies extending back to July 7, 2023 when in fact the patient had 2 testicles present. Unchanged appearance of the left testicle.  - CTA AP done, pending read  - F/u Vascular evaluation  - F/u Heme/onc eval  - Will d/w attending   Pt is a 69-year-old male with medical history of left testicular infarct status post resection by Dr. Moreno few months ago, sent in from hematology for expedited testing.     Plan:  - CTA AP showing no acute abdominopelvic abnormalities; recommend refer to US scrotum  - US Scrotum: Absent right testicle. Left epididymal head cyst, as seen on prior studies extending back to July 7, 2023 when in fact the patient had 2 testicles present. Unchanged appearance of the left testicle.  - F/u Vascular evaluation  - F/u Heme/onc eval  - Will d/w attending   Pt is a 69-year-old male with medical history of left testicular infarct status post resection by Dr. Moreno few months ago, sent in from hematology for expedited testing.     Plan:  - CTA AP showing no acute abdominopelvic abnormalities; recommend refer to US scrotum  - US Scrotum: Absent right testicle. Left epididymal head cyst, as seen on prior studies extending back to July 7, 2023 when in fact the patient had 2 testicles present. Unchanged appearance of the left testicle.  - F/u Vascular evaluation  - F/u Heme/onc eval  - Will d/w attending    ADDENDUM:  - Case discussed with Dr Moreno  - Patient with normal flow of remaining testicle  - Recommend OP f/u with  tomorrow morning at 9am for further evaluation and management

## 2024-03-21 NOTE — ED PROVIDER NOTE - ATTENDING APP SHARED VISIT CONTRIBUTION OF CARE
63-year-old male with medical history of dissecting cellulitis, hypercholesterolemia, left testicular infarct status post resection by Dr. Moreno few months ago, sent in from hematology for expedited testing.  Patient was seen by urology about a week and a half ago, concerned about right testicular ischemia/infarct.  Patient was sent to hematology for hypercoagulable workup, and to vascular to rule out any vascular/anatomical pathology.  Patient was seen by Dr. Rivera also recently and was sent for CTA abdomen pelvis.  Patient says that he made an appointment with outpatient radiology but does not have an appointment till April.  Hematology was concerned that it would take so long to get his imaging so sent to the ED for workup.  Patient admits he has been having chronic right testicular pain for the last few months.  Patient says this morning he noticed that he had some bleeding from the perineum.  Thinks that he had a pimple there.  Patient is not diabetic, does not have any fever.  Exam: nad, ncat, perrl, eomi, mmm, rrr, ctab, abd soft, nt, nd aox3,  exam: Right testicular tenderness, no significant swelling, bleeding furuncle in the perineum, MICHELLE negative impression: Patient with right testicular discomfort for few months, already had left testicle removed from infarct, concern now for right testicular ischemia/infarct.  Was sent to ED for expedited imaging and testing.

## 2024-03-21 NOTE — ED PROVIDER NOTE - OBJECTIVE STATEMENT
69-year-old male with past medical history of left orchiectomy 10/2023 for decreased flow to the testicle (follows with Dr. Moreno) presents with complaint of right-sided testicular pain.  Patient reports the right-sided testicular pain has been worsening over the past few months and states he followed up with Dr. Moreno since the surgery and had testicular ultrasound demonstrating decreased flow to right testicle as well.  Patient was referred to Dr. Rivera and had duplex of aortoiliac vessels with no signs of aortoiliac stenosis, and was seen by Dr. Cormier for rule out of factor V Leiden (which was ruled out).  Patient was seen by Dr. Cormier today and sent to emergency department for CTA abdomen and pelvis to rule out vascular compromise, and for evaluation by urology and vascular services.  Patient denies fever/chills, chest pain, shortness of breath, abdominal pain, nausea/vomiting/diarrhea, change in bowel/bladder habits, lightheadedness, dizziness. 69-year-old male with past medical history of left orchiectomy 10/2023 for decreased flow to the testicle (follows with Dr. Moreno) presents with complaint of left-sided testicular pain.  Patient reports the left-sided testicular pain has been worsening over the past few months and states he followed up with Dr. Moreno since the surgery and had testicular ultrasound demonstrating decreased flow to left testicle as well.  Patient was referred to Dr. Rivera and had duplex of aortoiliac vessels with no signs of aortoiliac stenosis, and was seen by Dr. Cormier for rule out of factor V Leiden (which was ruled out).  Patient was seen by Dr. Cormier today and sent to emergency department for CTA abdomen and pelvis to rule out vascular compromise of testicle, and for evaluation by urology and vascular services.  Patient denies fever/chills, chest pain, shortness of breath, abdominal pain, nausea/vomiting/diarrhea, change in bowel/bladder habits, lightheadedness, dizziness. 69-year-old male with past medical history of orchiectomy 10/2023 for decreased flow to the testicle (follows with Dr. Moreno) presents with complaint of pain to remaining testicle.  Patient reports testicular pain has been worsening over the past few months and states he followed up with Dr. Moreno since the surgery and had testicular ultrasound demonstrating decreased flow to remaining testicle as well.  Patient was referred to Dr. Rivera and had duplex of aortoiliac vessels with no signs of aortoiliac stenosis, and was seen by Dr. Cormier for rule out of factor V Leiden (which was ruled out).  Patient was seen by Dr. Cormier today and sent to emergency department for CTA abdomen and pelvis to rule out vascular compromise of testicle, and for evaluation by urology and vascular services.  Patient denies fever/chills, chest pain, shortness of breath, abdominal pain, nausea/vomiting/diarrhea, change in bowel/bladder habits, lightheadedness, dizziness.

## 2024-03-21 NOTE — CONSULT NOTE ADULT - SUBJECTIVE AND OBJECTIVE BOX
VASCULAR SURGERY CONSULT NOTE    Patient: DENNIS MORALES , 63y ([birth year])Male   MRN: [id number]  Location: [facility]  Visit: 03-21-24 Emergency  Date: 03-21-24 @ 17:34    HPI:  Pt is a 63-year-old male with medical history of dissecting cellulitis, hypercholesterolemia, history of L testicular infarct, underwent an orchiectomy with Dr. Moreno, presenting with testicular pain. The patient recently saw Dr. Rivera in the office, who performed an ultrasound to verify good blood flow of the vessels of the abdomen and pelvis and did not see any stenosis or occulsions. At that time, unable to visualize the hypogastric arteries, so Dr. Rivera recommended that the patient get a CTA of the abdomen and pelvis and follow up with him after it was done. In the ED, patient underwent CTA of the abdomen and pelvis which showed: no evidence of acute abdominopelvic abnormality, post right orchiectomy. No abdominal aortic aneurysm, calcified atherosclerotic disease of the aorta and its branches. Discussed findings with radiology, who reported that the hypogastric arteries were slightly difficult to fully visualize, though they did not appear to have occlusion or stenosis.    PAST MEDICAL & SURGICAL HISTORY:  Testicle trouble      H/O shoulder surgery      H/O vasectomy          Home Medications:        VITALS:  T(F): 98.2 (03-21-24 @ 13:38), Max: 98.2 (03-21-24 @ 13:38)  HR: 71 (03-21-24 @ 13:38) (71 - 71)  BP: 142/87 (03-21-24 @ 13:38) (142/87 - 142/87)  RR: 18 (03-21-24 @ 13:38) (18 - 18)  SpO2: 98% (03-21-24 @ 13:38) (98% - 98%)      MEDICATIONS  (STANDING):    MEDICATIONS  (PRN):      LAB/STUDIES:                        13.5   6.79  )-----------( 246      ( 21 Mar 2024 14:27 )             39.8     03-21    135  |  99  |  19  ----------------------------<  97  4.4   |  26  |  1.0    Ca    9.5      21 Mar 2024 14:27    TPro  7.5  /  Alb  4.5  /  TBili  0.7  /  DBili  x   /  AST  20  /  ALT  25  /  AlkPhos  93  03-21      LIVER FUNCTIONS - ( 21 Mar 2024 14:27 )  Alb: 4.5 g/dL / Pro: 7.5 g/dL / ALK PHOS: 93 U/L / ALT: 25 U/L / AST: 20 U/L / GGT: x           Urinalysis Basic - ( 21 Mar 2024 16:43 )    Color: Yellow / Appearance: Clear / SG: >1.030 / pH: x  Gluc: x / Ketone: Negative mg/dL  / Bili: Negative / Urobili: 0.2 mg/dL   Blood: x / Protein: Negative mg/dL / Nitrite: Negative   Leuk Esterase: Negative / RBC: x / WBC x   Sq Epi: x / Non Sq Epi: x / Bacteria: x

## 2024-03-21 NOTE — HISTORY OF PRESENT ILLNESS
[de-identified] : Mr. DENNIS MORALES is a 63 year old male here today for evaluation and management of ischemia/infarction of the left testicle.     DENNIS is a 63 year old M with PMHx including hypercholesterolemia, who presents to clinic to establish care.     He is presently feeling well with no new complaints.  Patient denies chest pain, palpitations, dyspnea, unintentional weight loss or bleeding.  Patient reports family history of malignancy or blood/clotting disorder.     RADIOLOGIC WORKUP US Scrotum (2.14.2024) IMPRESSION:1. Diffusely heterogeneous echotexture to the left testicle with cystic change, an abnormal finding. Vascular flow is identified, but is diminished.2. Left epididymal cysts measuring up to 0.8 cm.3. No testicle is visualized in the right scrotal sac. US Scrotum (8.15.2023) IMPRESSION:Normal-appearing right testicle with documented flow.Findings compatible with infarct of the left testicle with few areas of peripheral flow.Trace right hydrocele and bilateral epididymal cysts. MR Pelvis (8.14.2023) IMPRESSION:Chronic ischemia/infarction of the left testicle.Small fat-containing left inguinal hernia (8/12).Few left paratesticular cysts measure up to 8 mm possibly representing epididymal cysts. US Scrotum (7.8.2023) IMPRESSION:Septated cystic structure measuring 1.1 x 1.4 x 0.6 cm adjacent to the left testicle and incompletely characterized on ultrasound. Consider MRI with contrast for further characterization.Bilateral varicoceles measuring 2 mm.Right hydrocele with approximate volume of 7 mL.  LAB WORKUP (9.22.2023) WBC 8.26, Hgb 14.2, , Cr 1.2, eGFR 68, normal LFTs, hematuria (-), PT 10.10, INR 0.89, aPTT 33.2   PATHOLOGY (10.22.2023) Left Testicle: - Benign testicular parenchyma with vascular congestion. - Adjacent vessels showing vascular congestion and some    vascular lumen filled with thrombus   (see Comment).  HCM Colonoscopy? Upper Endoscopy?

## 2024-03-21 NOTE — ED PROVIDER NOTE - CARE PROVIDER_API CALL
Hien Moreno.  Urology  [address] [place] [address], [address]  [place] [address]  Phone: [phone]  Fax: [phone]  Scheduled Appointment: 03/22/2024 09:00 AM

## 2024-03-21 NOTE — CONSULT NOTE ADULT - ASSESSMENT
Pt is a 63-year-old male with medical history of dissecting cellulitis, hypercholesterolemia, history of L testicular infarct, underwent an orchiectomy with Dr. Moreno, presenting with testicular pain. The patient recently saw Dr. Rivera in the office, who performed an ultrasound to verify good blood flow of the vessels of the abdomen and pelvis and did not see any stenosis or occulsions. At that time, unable to visualize the hypogastric arteries on ultrasound, so Dr. Rivera recommended that the patient get a CTA of the abdomen and pelvis and follow up with him after it was done.     Plan:  - CTA: no evidence of acute abdominopelvic abnormality, post right orchiectomy. No abdominal aortic aneurysm, calcified atherosclerotic disease of the aorta and its branches. Discussed findings with radiology, who reported that the hypogastric arteries were slightly difficult to fully visualize, though they did not appear to have occlusion or stenosis.  - reviewed imaging with vascular fellow, hypogastrics patent  - no acute surgical intervention, can follow up with Dr. Rivera outpatient   - of note, gallbladder fundal thickening, radiology recommending outpatient MRI  - follow up urology for testicular pain    Discussed the above with vascular fellow Robert

## 2024-03-21 NOTE — REASON FOR VISIT
[Consultation] : a consultation visit [FreeTextEntry1] : For decreased flow to his testicle and pelvic arterial circulation evaluation 96.9 97.2

## 2024-03-21 NOTE — ED PROVIDER NOTE - PROGRESS NOTE DETAILS
AY: Urology and vascular consult requested. AY: Patient was found to have an incidental finding on their imaging in the emergency department today. A copy of the result was given to the patient, and they were advised to follow up with their primary doctor. They are aware that this may be very important and may require further work-up and repeat imaging. All questions and concerns about this finding were addressed.    Per MACIEJ Gonzalez of urology, patient will follow-up tomorrow morning with Dr. Moreno and is clear for discharge.

## 2024-03-21 NOTE — ED PROVIDER NOTE - NSFOLLOWUPINSTRUCTIONS_ED_ALL_ED_FT
Follow-up with Dr. Moreno tomorrow  at 9am.    Testicle Pain    WHAT YOU NEED TO KNOW:    Testicle pain may start in your scrotum and spread to your abdomen. You may have sharp, sudden pain or dull pain that happens over time. Your testicle pain may come and go, or it may last for a long time. The cause of your pain may be unknown. Testicle pain can be caused by infection, trauma, hernia, kidney stones, or sexually transmitted infections (STIs). You may have a painful lump in your scrotum. The lump may be caused by an enlarged vein or fluid that collects around one of your testicles. This lump also may be caused by a more serious medical condition. Part of your testicle may twist. This is a serious condition that needs treatment as soon as possible.    DISCHARGE INSTRUCTIONS:    Medicines:    Antibiotics: This medicine helps fight or prevent infection. Take your antibiotics until they are gone, even if you feel better.    Pain medicine: You may be given a prescription medicine to decrease pain. Do not wait until the pain is severe before you take this medicine.    NSAIDs: These medicines decrease swelling, pain, and fever. NSAIDs are available without a doctor's order. Ask your healthcare provider which medicine is right for you. Ask how much to take and when to take it. Take as directed. NSAIDs can cause stomach bleeding and kidney problems if not taken correctly.    Take your medicine as directed. Contact your healthcare provider if you think your medicine is not helping or if you have side effects. Tell your provider if you are allergic to any medicine. Keep a list of the medicines, vitamins, and herbs you take. Include the amounts, and when and why you take them. Bring the list or the pill bottles to follow-up visits. Carry your medicine list with you in case of an emergency.  Decrease discomfort: With treatment, your pain may improve within 1 to 3 days. Depending on the cause of your testicle pain, your condition may take up to 4 weeks to heal.    Rest: Limit your activity until your pain decreases. Get more rest while you heal. Do not sit for long periods of time.    Cold packs: Place cold packs on your testicles to help ease your pain. Use cold packs as directed.    Elevation: Gently tuck a folded towel under your testicles to lift them as you sit in a chair or lie in bed. This will help ease your pain and decrease swelling.  Follow up with your healthcare provider or urologist in 3 to 7 days: Write down your questions so you remember to ask them during your visits.    Sexual activity: Avoid sexual activity until you have finished your antibiotics or until your healthcare provider tells you it is safe to have sex. Use condoms to lower your risk of STIs.    Contact your healthcare provider or urologist if:    You feel that your medicine or treatment is not working.    You feel more pain, tenderness, or swelling than before.    You have nausea or a low fever.    You have questions or concerns about your condition or care.  Return to the emergency department if:    You have sudden or severe pain in your testicles or abdomen.    You have pain in both testicles.    You are vomiting.    You have a high fever.    Your pain increases when you elevate your testicles.    Your scrotum turns blue. This could mean your testicle is not getting the blood flow it needs.

## 2024-03-21 NOTE — CONSULT NOTE ADULT - SUBJECTIVE AND OBJECTIVE BOX
Urology Consult Note:   Pt is a 69-year-old male with medical history of dissecting cellulitis, hypercholesterolemia, left testicular infarct status post resection by Dr. Moreno few months ago, sent in from hematology for expedited testing.  Patient was seen by urology about a week and a half ago, concerned about right testicular ischemia/infarct.  Patient was sent to hematology for hypercoagulable workup, and to vascular to rule out any vascular/anatomical pathology.  Patient was seen by Dr. Rivera also recently and was sent for CTA abdomen pelvis.  Patient says that he made an appointment with outpatient radiology but does not have an appointment till April.  Hematology was concerned that it would take so long to get his imaging so sent to the ED for workup.  Patient admits he has been having chronic right testicular pain for the last few months.  Patient says this morning he noticed that he had some bleeding from the perineum.  Thinks that he had a pimple there.  Patient is not diabetic, does not have any fever.      [ x ] A 10 Point Review of Systems was negative except where noted    PAST MEDICAL & SURGICAL HISTORY:  Testicle trouble  H/O shoulder surgery  H/O vasectomy    MEDICATIONS  (STANDING):  aspirin  chewable 81 milliGRAM(s) Oral once    Allergies: No Known Allergies    SOCIAL HISTORY: No illicit drug use    FAMILY HISTORY:  FH: cancer (Father, Mother)    PHYSICAL EXAM:  Constitutional: NAD, well-developed  Back: No CVA ttp bilaterally  Resp: No accessory respiratory muscle use  Abd: Soft, NT/ND. No suprapubic ttp  :   Ext: No edema or cyanosis, SCHNEIDER x 4  Neuro: Awake and alert  Psych: Normal mood, normal affect  Skin: Good color, non diaphoretic    Vital Signs Last 24 Hrs  T(C): 36.8 (21 Mar 2024 13:38), Max: 36.8 (21 Mar 2024 13:38)  T(F): 98.2 (21 Mar 2024 13:38), Max: 98.2 (21 Mar 2024 13:38)  HR: 71 (21 Mar 2024 13:38) (71 - 71)  BP: 142/87 (21 Mar 2024 13:38) (142/87 - 142/87)  RR: 18 (21 Mar 2024 13:38) (18 - 18)  SpO2: 98% (21 Mar 2024 13:38) (98% - 98%)    Parameters below as of 21 Mar 2024 13:38  Patient On (Oxygen Delivery Method): room air      LABS:                      13.5   6.79  )-----------( 246      ( 21 Mar 2024 14:27 )             39.8     03-21-24  135  |  99  |  19  ----------------------------<  97  4.4   |  26  |  1.0    Ca    9.5      21 Mar 2024 14:27    TPro  7.5  /  Alb  4.5  /  TBili  0.7  /  DBili  x   /  AST  20  /  ALT  25  /  AlkPhos  93  03-21    Urinalysis Basic - ( 21 Mar 2024 14:27 )  Color: x / Appearance: x / SG: x / pH: x  Gluc: 97 mg/dL / Ketone: x  / Bili: x / Urobili: x   Blood: x / Protein: x / Nitrite: x   Leuk Esterase: x / RBC: x / WBC x   Sq Epi: x / Non Sq Epi: x / Bacteria: x      RADIOLOGY & ADDITIONAL STUDIES:  ACC: [id number] EXAM:  US SCROTUM AND CONTENTS   ORDERED BY: KVNG ROCHA     PROCEDURE DATE:  03/21/2024      INTERPRETATION:  CLINICAL INFORMATION: Evaluate for torsion.    COMPARISON: Ultrasound dated February 13, 2024. Ultrasound dated July 7, 2023    TECHNIQUE: Testicular ultrasound utilizing color and spectral Doppler.    FINDINGS:    RIGHT:  Right testis: Absent.    LEFT:  Left testis: 3.2 x 1.5 x 1.8 cm. Relatively hypoechoic and heterogeneous   in echotexture with areas of calcification. Normal arterial and venous   blood flow pattern.  Left epididymis: Epididymal head cyst.  Left hydrocele: None.  Left varicocele: None.    IMPRESSION:  Absent right testicle.    Left epididymal head cyst, as seen on prior studies extending back to   July 7, 2023 when in fact the patient had 2 testicles present.    Unchanged appearance of the left testicle.    --- End of Report ---    KAREN PETERSON MD; Attending Interventional Radiologist  This document has been electronically signed. Mar 21 2024  4:08PM   Urology Consult Note:   Pt is a 69-year-old male with medical history of dissecting cellulitis, hypercholesterolemia, left testicular infarct status post resection by Dr. Moreno few months ago, sent in from hematology for expedited testing.  Patient was seen by urology about a week and a half ago, concerned about right testicular ischemia/infarct.  Patient was sent to hematology for hypercoagulable workup, and to vascular to rule out any vascular/anatomical pathology.  Patient was seen by Dr. Rivera also recently and was sent for CTA abdomen pelvis.  Patient says that he made an appointment with outpatient radiology but does not have an appointment till April.  Hematology was concerned that it would take so long to get his imaging so sent to the ED for workup.  Patient admits he has been having chronic right testicular pain for the last few months.  Patient says this morning he noticed that he had some bleeding from the perineum.  Thinks that he had a pimple there.  Patient is not diabetic, does not have any fever.      [ x ] A 10 Point Review of Systems was negative except where noted    PAST MEDICAL & SURGICAL HISTORY:  Testicle trouble  H/O shoulder surgery  H/O vasectomy    MEDICATIONS  (STANDING):  aspirin  chewable 81 milliGRAM(s) Oral once    Allergies: No Known Allergies    SOCIAL HISTORY: No illicit drug use    FAMILY HISTORY:  FH: cancer (Father, Mother)    PHYSICAL EXAM:  Constitutional: NAD, well-developed  Back: No CVA ttp bilaterally  Resp: No accessory respiratory muscle use  Abd: Soft, NT/ND. No suprapubic ttp  :   Ext: No edema or cyanosis, SCHNEIDER x 4  Neuro: Awake and alert  Psych: Normal mood, normal affect  Skin: Good color, non diaphoretic    Vital Signs Last 24 Hrs  T(C): 36.8 (21 Mar 2024 13:38), Max: 36.8 (21 Mar 2024 13:38)  T(F): 98.2 (21 Mar 2024 13:38), Max: 98.2 (21 Mar 2024 13:38)  HR: 71 (21 Mar 2024 13:38) (71 - 71)  BP: 142/87 (21 Mar 2024 13:38) (142/87 - 142/87)  RR: 18 (21 Mar 2024 13:38) (18 - 18)  SpO2: 98% (21 Mar 2024 13:38) (98% - 98%)    Parameters below as of 21 Mar 2024 13:38  Patient On (Oxygen Delivery Method): room air      LABS:                      13.5   6.79  )-----------( 246      ( 21 Mar 2024 14:27 )             39.8     03-21-24  135  |  99  |  19  ----------------------------<  97  4.4   |  26  |  1.0    Ca    9.5      21 Mar 2024 14:27    TPro  7.5  /  Alb  4.5  /  TBili  0.7  /  DBili  x   /  AST  20  /  ALT  25  /  AlkPhos  93  03-21    Urinalysis Basic - ( 21 Mar 2024 14:27 )  Color: x / Appearance: x / SG: x / pH: x  Gluc: 97 mg/dL / Ketone: x  / Bili: x / Urobili: x   Blood: x / Protein: x / Nitrite: x   Leuk Esterase: x / RBC: x / WBC x   Sq Epi: x / Non Sq Epi: x / Bacteria: x      RADIOLOGY & ADDITIONAL STUDIES:  ACC: [id number] EXAM:  US SCROTUM AND CONTENTS   ORDERED BY: KVNG ROCHA     PROCEDURE DATE:  03/21/2024      INTERPRETATION:  CLINICAL INFORMATION: Evaluate for torsion.    COMPARISON: Ultrasound dated February 13, 2024. Ultrasound dated July 7, 2023    TECHNIQUE: Testicular ultrasound utilizing color and spectral Doppler.    FINDINGS:    RIGHT:  Right testis: Absent.    LEFT:  Left testis: 3.2 x 1.5 x 1.8 cm. Relatively hypoechoic and heterogeneous   in echotexture with areas of calcification. Normal arterial and venous   blood flow pattern.  Left epididymis: Epididymal head cyst.  Left hydrocele: None.  Left varicocele: None.    IMPRESSION:  Absent right testicle.    Left epididymal head cyst, as seen on prior studies extending back to   July 7, 2023 when in fact the patient had 2 testicles present.    Unchanged appearance of the left testicle.    --- End of Report ---    KAREN PETERSON MD; Attending Interventional Radiologist  This document has been electronically signed. Mar 21 2024  4:08PM        ACC: [id number] EXAM:  CT ANGIO ABD PELV (W)AW IC   ORDERED BY: KVNG ROCHA     PROCEDURE DATE:  03/21/2024      INTERPRETATION:  Clinical Indication: Testicular pain.    Technique: Multidetector-row CTA images of the abdomen and pelvis were   obtained from the xiphoid through the symphysis pubis following the   administration of intravenous contrast. No oral contrast was   administered.  Coronal and sagittal reconstructions were performed.    Contrast: 100 cc Omnipaque 350 non-ionic intravenous contrast.    Comparison: Scrotal ultrasound 3/21/2024.    FINDINGS:    01. LIVER: Normal morphology.  No focal lesion.  02. SPLEEN: Normal.  03. PANCREAS: Normal.  04. GALLBLADDER/BILIARY TREE: Questionable gallbladder fundal thickening   (series 9 image 83) may represent adenomyomatosis. No inflammatory   changes. No biliary dilation  05. ADRENALS: Normal.  06. KIDNEYS: Symmetric enhancement.  No hydronephrosis or renal stone.   Left renal cyst.  07. LYMPHADENOPATHY/RETROPERITONEUM: No lymphadenopathy.  08. BOWEL: No bowel obstruction.  09. PELVIC VISCERA: Prostate measures 5 cm traversed dimension (series 5   image 119). Under distended urinary bladder. Post right orchiectomy.  10. PELVIC LYMPH NODES: No lymphadenopathy.  11. VASCULATURE: No abdominal aortic aneurysm. Calcified atherosclerotic   disease of the aorta and its branches.  12. PERITONEUM/ABDOMINAL WALL: No gross ascites. Small fat-containing   umbilical hernia.  13. SKELETAL: Degenerative changes.  14. LUNG BASES: Bilateral small atelectasis.    IMPRESSION:    No evidence of acute abdominopelvic abnormality.    Post right orchiectomy. Please refer to same-day scrotal ultrasound for   evaluation of the left testicle.    Additional findings detailed in the body of the report.    --- End of Report ---    AUBREE MARVIN MD; Resident Radiologist  This document has been electronically signed.  FEDERICA CORDON MD; Attending Radiologist  This document has been electronically signed. Mar 21 2024  4:30PM     Urology Consult Note:   Pt is a 69-year-old male with medical history of dissecting cellulitis, hypercholesterolemia, left testicular infarct status post resection by Dr. Moreno few months ago, sent in from hematology for expedited testing.  Patient was seen by urology about a week and a half ago, concerned about right testicular ischemia/infarct.  Patient was sent to hematology for hypercoagulable workup, and to vascular to rule out any vascular/anatomical pathology.  Patient was seen by Dr. Rivera also recently and was sent for CTA abdomen pelvis.  Patient says that he made an appointment with outpatient radiology but does not have an appointment till April.  Hematology was concerned that it would take so long to get his imaging so sent to the ED for workup.  Patient admits he has been having chronic right testicular pain for the last few months.  Patient says this morning he noticed that he had some bleeding from the perineum.  Thinks that he had a pimple there.  Patient is not diabetic, does not have any fever.      [ x ] A 10 Point Review of Systems was negative except where noted    PAST MEDICAL & SURGICAL HISTORY:  Testicle trouble  H/O shoulder surgery  H/O vasectomy    MEDICATIONS  (STANDING):  aspirin  chewable 81 milliGRAM(s) Oral once    Allergies: No Known Allergies    SOCIAL HISTORY: No illicit drug use    FAMILY HISTORY:  FH: cancer (Father, Mother)    PHYSICAL EXAM:  Constitutional: NAD, well-developed  Back: No CVA ttp bilaterally  Resp: No accessory respiratory muscle use  Abd: Soft, NT/ND. No suprapubic ttp  : Uncircumcised, +testicle with mild ttp seen in the left hemiscrotum though unclear if spermatic chord runs through to the right vs left inguinal canal. Meatus patent and without blood or discharge  Ext: No edema or cyanosis, SCHNEIDER x 4  Neuro: Awake and alert  Psych: Normal mood, normal affect  Skin: Good color, non diaphoretic    Vital Signs Last 24 Hrs  T(C): 36.8 (21 Mar 2024 13:38), Max: 36.8 (21 Mar 2024 13:38)  T(F): 98.2 (21 Mar 2024 13:38), Max: 98.2 (21 Mar 2024 13:38)  HR: 71 (21 Mar 2024 13:38) (71 - 71)  BP: 142/87 (21 Mar 2024 13:38) (142/87 - 142/87)  RR: 18 (21 Mar 2024 13:38) (18 - 18)  SpO2: 98% (21 Mar 2024 13:38) (98% - 98%)    Parameters below as of 21 Mar 2024 13:38  Patient On (Oxygen Delivery Method): room air      LABS:                      13.5   6.79  )-----------( 246      ( 21 Mar 2024 14:27 )             39.8     03-21-24  135  |  99  |  19  ----------------------------<  97  4.4   |  26  |  1.0    Ca    9.5      21 Mar 2024 14:27    TPro  7.5  /  Alb  4.5  /  TBili  0.7  /  DBili  x   /  AST  20  /  ALT  25  /  AlkPhos  93  03-21    Urinalysis Basic - ( 21 Mar 2024 14:27 )  Color: x / Appearance: x / SG: x / pH: x  Gluc: 97 mg/dL / Ketone: x  / Bili: x / Urobili: x   Blood: x / Protein: x / Nitrite: x   Leuk Esterase: x / RBC: x / WBC x   Sq Epi: x / Non Sq Epi: x / Bacteria: x      RADIOLOGY & ADDITIONAL STUDIES:  ACC: [id number] EXAM:  US SCROTUM AND CONTENTS   ORDERED BY: KVNG ROCHA     PROCEDURE DATE:  03/21/2024      INTERPRETATION:  CLINICAL INFORMATION: Evaluate for torsion.    COMPARISON: Ultrasound dated February 13, 2024. Ultrasound dated July 7, 2023    TECHNIQUE: Testicular ultrasound utilizing color and spectral Doppler.    FINDINGS:    RIGHT:  Right testis: Absent.    LEFT:  Left testis: 3.2 x 1.5 x 1.8 cm. Relatively hypoechoic and heterogeneous   in echotexture with areas of calcification. Normal arterial and venous   blood flow pattern.  Left epididymis: Epididymal head cyst.  Left hydrocele: None.  Left varicocele: None.    IMPRESSION:  Absent right testicle.    Left epididymal head cyst, as seen on prior studies extending back to   July 7, 2023 when in fact the patient had 2 testicles present.    Unchanged appearance of the left testicle.    --- End of Report ---    KAREN PETERSON MD; Attending Interventional Radiologist  This document has been electronically signed. Mar 21 2024  4:08PM        ACC: [id number] EXAM:  CT ANGIO ABD PELV (W)AW IC   ORDERED BY: KVNG ROCHA     PROCEDURE DATE:  03/21/2024      INTERPRETATION:  Clinical Indication: Testicular pain.    Technique: Multidetector-row CTA images of the abdomen and pelvis were   obtained from the xiphoid through the symphysis pubis following the   administration of intravenous contrast. No oral contrast was   administered.  Coronal and sagittal reconstructions were performed.    Contrast: 100 cc Omnipaque 350 non-ionic intravenous contrast.    Comparison: Scrotal ultrasound 3/21/2024.    FINDINGS:    01. LIVER: Normal morphology.  No focal lesion.  02. SPLEEN: Normal.  03. PANCREAS: Normal.  04. GALLBLADDER/BILIARY TREE: Questionable gallbladder fundal thickening   (series 9 image 83) may represent adenomyomatosis. No inflammatory   changes. No biliary dilation  05. ADRENALS: Normal.  06. KIDNEYS: Symmetric enhancement.  No hydronephrosis or renal stone.   Left renal cyst.  07. LYMPHADENOPATHY/RETROPERITONEUM: No lymphadenopathy.  08. BOWEL: No bowel obstruction.  09. PELVIC VISCERA: Prostate measures 5 cm traversed dimension (series 5   image 119). Under distended urinary bladder. Post right orchiectomy.  10. PELVIC LYMPH NODES: No lymphadenopathy.  11. VASCULATURE: No abdominal aortic aneurysm. Calcified atherosclerotic   disease of the aorta and its branches.  12. PERITONEUM/ABDOMINAL WALL: No gross ascites. Small fat-containing   umbilical hernia.  13. SKELETAL: Degenerative changes.  14. LUNG BASES: Bilateral small atelectasis.    IMPRESSION:    No evidence of acute abdominopelvic abnormality.    Post right orchiectomy. Please refer to same-day scrotal ultrasound for   evaluation of the left testicle.    Additional findings detailed in the body of the report.    --- End of Report ---    AUBREE MARVIN MD; Resident Radiologist  This document has been electronically signed.  FEDERICA CORDON MD; Attending Radiologist  This document has been electronically signed. Mar 21 2024  4:30PM

## 2024-03-21 NOTE — ED PROVIDER NOTE - PHYSICAL EXAMINATION
Vital Signs: I have reviewed the initial vital signs.  Constitutional: appears stated age, no acute distress  Eyes: Sclera clear, EOMI.  Cardiovascular: S1 and S2, regular rate, regular rhythm, well-perfused extremities, radial pulses equal and 2+, pedal pulses 2+ and equal  Respiratory: unlabored respiratory effort, clear to auscultation bilaterally no wheezing, rales, or rhonchi  Gastrointestinal:  abdomen soft, non-tender  : + R testicle tenderness to palpation no discharge. Chaperoned by Dr. Mooney  Rectal: MICHELLE negative for blood. + superficial <0.5cm pimple with scant serosanguinous fluid drainage. Chaperoned by Dr. Mooney  Musculoskeletal: supple neck, no lower extremity edema  Integumentary: warm, dry, no rash  Neurologic: awake, alert, oriented x3, extremities’ motor and sensory functions grossly intact Vital Signs: I have reviewed the initial vital signs.  Constitutional: appears stated age, no acute distress  Eyes: Sclera clear, EOMI.  Cardiovascular: S1 and S2, regular rate, regular rhythm, well-perfused extremities, radial pulses equal and 2+, pedal pulses 2+ and equal  Respiratory: unlabored respiratory effort, clear to auscultation bilaterally no wheezing, rales, or rhonchi  Gastrointestinal:  abdomen soft, non-tender  : + L testicle tenderness to palpation no discharge. Chaperoned by Dr. Mooney  Rectal: MICHELLE negative for blood. + superficial <0.5cm pimple with scant serosanguinous fluid drainage. Chaperoned by Dr. Mooney  Musculoskeletal: supple neck, no lower extremity edema  Integumentary: warm, dry, no rash  Neurologic: awake, alert, oriented x3, extremities’ motor and sensory functions grossly intact Vital Signs: I have reviewed the initial vital signs.  Constitutional: appears stated age, no acute distress  Eyes: Sclera clear, EOMI.  Cardiovascular: S1 and S2, regular rate, regular rhythm, well-perfused extremities, radial pulses equal and 2+, pedal pulses 2+ and equal  Respiratory: unlabored respiratory effort, clear to auscultation bilaterally no wheezing, rales, or rhonchi  Gastrointestinal:  abdomen soft, non-tender  : + lone testicle tenderness to palpation no discharge. Chaperoned by Dr. Mooney  Rectal: MICHELLE negative for blood. + superficial <0.5cm pimple with scant serosanguinous fluid drainage. Chaperoned by Dr. Mooney  Musculoskeletal: supple neck, no lower extremity edema  Integumentary: warm, dry, no rash  Neurologic: awake, alert, oriented x3, extremities’ motor and sensory functions grossly intact

## 2024-03-21 NOTE — ASSESSMENT
[FreeTextEntry1] : # Chronic ischemia/infarction of the left testicle, dx 08/2023 - followup with URO, Dr. Moreno, as scheduled  hypercoagulability w/u is NOT REMARKABLE - appreciated vascular consult: CT A/P ANGIOGRAM is pending  3/21/24 : C/O same discomfort in right testicle, same as before the problem in left testicle;d/w ; agreed to send to ER and get both vascular and  eval asap and get CT ANGIO A/P IVC ASAP

## 2024-03-21 NOTE — ED PROVIDER NOTE - CLINICAL SUMMARY MEDICAL DECISION MAKING FREE TEXT BOX
PT with testicular ischemia, one already resected by urology, now with pain of the lone testicle.  pt was sent in by heme onc for expedited imaging.  neg imaging for any acute pathology.  urology and vascular consulted , both cleared pt for outpt f/u .  pt has appt with dr. mcgregor tomorrow.  Any ordered labs and EKG were reviewed, Dr. Armida Mooney, attending physician.  Any imaging was ordered and reviewed by me, Dr. Armida Mooney, attending physician.  Appropriate medications for patient's presenting complaints were ordered and effects were reassessed.  Patient's records, if available,  (prior hospital, ED visit, and/or nursing home notes if available) were reviewed.  Additional history was obtained from EMS, family, and/or PCP (when available).  Escalation to admission/observation was considered.  1) However patient feels much better and is comfortable with discharge.  Appropriate follow-up was arranged.

## 2024-03-21 NOTE — ED PROVIDER NOTE - PATIENT PORTAL LINK FT
You can access the FollowMyHealth Patient Portal offered by [facility] by registering at the following website: [email or web address]. By joining [employer]’s FollowMyHealth portal, you will also be able to view your health information using other applications (apps) compatible with our system.

## 2024-03-21 NOTE — CONSULT NOTE ADULT - CONSULT REASON
patient supposed to follow up with Dr. Rivera after getting CTA
Pain and decreased vascular flow to right testicle

## 2024-03-21 NOTE — ED PROVIDER NOTE - NSICDXFAMILYHX_GEN_ALL_CORE_FT
IV bolus 500cc/hr LR Patient to be admitted. FAMILY HISTORY:  Father  Still living? Unknown  FH: cancer, Age at diagnosis: Age Unknown    Mother  Still living? No  FH: cancer, Age at diagnosis: Age Unknown

## 2024-03-22 ENCOUNTER — APPOINTMENT (OUTPATIENT)
Dept: UROLOGY | Facility: CLINIC | Age: 64
End: 2024-03-22
Payer: MEDICARE

## 2024-03-22 VITALS — WEIGHT: 234 LBS | BODY MASS INDEX: 36.65 KG/M2

## 2024-03-22 VITALS
HEART RATE: 63 BPM | SYSTOLIC BLOOD PRESSURE: 148 MMHG | HEIGHT: 67 IN | WEIGHT: 230 LBS | TEMPERATURE: 96.2 F | BODY MASS INDEX: 36.1 KG/M2 | DIASTOLIC BLOOD PRESSURE: 93 MMHG

## 2024-03-22 VITALS
TEMPERATURE: 96.2 F | DIASTOLIC BLOOD PRESSURE: 93 MMHG | HEIGHT: 67 IN | SYSTOLIC BLOOD PRESSURE: 148 MMHG | BODY MASS INDEX: 36.1 KG/M2 | WEIGHT: 230 LBS

## 2024-03-22 DIAGNOSIS — I77.1 STRICTURE OF ARTERY: ICD-10-CM

## 2024-03-22 PROCEDURE — 99215 OFFICE O/P EST HI 40 MIN: CPT | Mod: 25

## 2024-03-22 PROCEDURE — 64425 NJX AA&/STRD II IH NERVES: CPT | Mod: 50

## 2024-03-23 LAB
CULTURE RESULTS: SIGNIFICANT CHANGE UP
SPECIMEN SOURCE: SIGNIFICANT CHANGE UP

## 2024-04-15 NOTE — ASSESSMENT
[FreeTextEntry1] : I gave him a cord block on the left side and it made no change in his ability to tolerate an exam.  I then gave a cord block on the right side and though it was  he was able to have me palpate what is in there that is hurting him.  Of note is the area that is hurting him is about a centimeter in size probably ultrasound shows a testicle though atrophic that is much closer to normal.  However the area is too tender even after the bilateral blocks and though I can push the what I think is the right testicle up into the right external ring the exam is not definitive.  As well whether it is the right testicle or the left testicle this area is hurting him so much that if he has to have the right testicle removed as well making him anorchic and go on testosterone he is willing.  He does not want to have kids, his penis does not hurt and he is just having so much pain that he cannot live like this.  Blood flow on the scrotal ultrasound this time was read as normal but I cannot explain why the blood flow was going back and forth.  My scar is in the left hemiscrotum but in order to figure this out I am going to send him for a pelvic MRI we should be able to track things better.  If the study suggest not diagnostic he will have the option of living as he is perhaps sending him to pain therapy or going for scrotal exploration trying to see what that 1 cm lump is perhaps it is the distal cord that formed a neuroma and/or removing the right testicle.  Please note he is saying now that the discomfort is in the region that he would consider the left testicle and when I was holding his right testicle or at least what I think was his right testicle it is not bothering him this implies that this may be the equivalent of what if this was a vasectomy we would call a granuloma

## 2024-04-15 NOTE — HISTORY OF PRESENT ILLNESS
[Currently Experiencing ___] :  [unfilled] [8] : 8 [FreeTextEntry1] : Anthony is a 63-year-old male born 1960, that I had seen with severe left-sided testicular pain and on October 10, 2023 I took out what I believed was the left testicle and he had pain relief.  About a month later he started having pain which I felt was on the right side we got an ultrasound which initially had showed good flow to the right now showing bad flow but they are saying it is the left testicle.  I examined him and when I touch him he tells me him touching his right testicle I feel a cord on both sides because the orchiectomy was done just above the epididymis leaving the cord and he had severe pain so we sent him into the emergency room.  He seen the oncologist who found no biochemical cause for increased clotting he saw the vascular surgeon who found no anatomical cause for decreased blood flow so we cannot explain why he is having ultrasounds that show decreased flow to the remaining testicle.  Please note the testicle we took out pathologically had the arterial system filled with clot and it showed signs of chronic ischemia.  I spoke to the PA told him to come in this morning.

## 2024-04-15 NOTE — LETTER BODY
[Dear  ___] : Dear  [unfilled], [Please see my note below.] : Please see my note below. [Sincerely,] : Sincerely, [FreeTextEntry2] : Bob Castellanos MD [address] [address]

## 2024-04-15 NOTE — LETTER HEADER
[FreeTextEntry3] : Hien Moreno MD [facility], [address] [place] [address]

## 2024-04-24 ENCOUNTER — TRANSCRIPTION ENCOUNTER (OUTPATIENT)
Age: 64
End: 2024-04-24

## 2024-06-20 ENCOUNTER — APPOINTMENT (OUTPATIENT)
Age: 64
End: 2024-06-20

## 2024-07-08 ENCOUNTER — APPOINTMENT (OUTPATIENT)
Dept: UROLOGY | Facility: CLINIC | Age: 64
End: 2024-07-08
